# Patient Record
Sex: FEMALE | Race: WHITE
[De-identification: names, ages, dates, MRNs, and addresses within clinical notes are randomized per-mention and may not be internally consistent; named-entity substitution may affect disease eponyms.]

---

## 2017-07-20 ENCOUNTER — HOSPITAL ENCOUNTER (INPATIENT)
Dept: HOSPITAL 17 - NEPI | Age: 37
LOS: 8 days | Discharge: HOME | DRG: 957 | End: 2017-07-28
Attending: SURGERY | Admitting: SURGERY
Payer: COMMERCIAL

## 2017-07-20 VITALS — WEIGHT: 111.33 LBS | HEIGHT: 67 IN | BODY MASS INDEX: 17.47 KG/M2

## 2017-07-20 VITALS — OXYGEN SATURATION: 100 %

## 2017-07-20 VITALS — OXYGEN SATURATION: 99 % | TEMPERATURE: 98 F | RESPIRATION RATE: 16 BRPM | HEART RATE: 66 BPM

## 2017-07-20 DIAGNOSIS — F41.9: ICD-10-CM

## 2017-07-20 DIAGNOSIS — F33.41: ICD-10-CM

## 2017-07-20 DIAGNOSIS — R40.2422: ICD-10-CM

## 2017-07-20 DIAGNOSIS — S32.038A: ICD-10-CM

## 2017-07-20 DIAGNOSIS — F11.20: ICD-10-CM

## 2017-07-20 DIAGNOSIS — S01.81XA: ICD-10-CM

## 2017-07-20 DIAGNOSIS — T17.890A: ICD-10-CM

## 2017-07-20 DIAGNOSIS — T79.4XXA: ICD-10-CM

## 2017-07-20 DIAGNOSIS — E87.6: ICD-10-CM
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
## 2017-07-20 DIAGNOSIS — Z88.0: ICD-10-CM

## 2017-07-20 DIAGNOSIS — S32.592A: ICD-10-CM

## 2017-07-20 DIAGNOSIS — S36.032A: Primary | ICD-10-CM

## 2017-07-20 DIAGNOSIS — J98.11: ICD-10-CM
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
## 2017-07-20 DIAGNOSIS — M41.9: ICD-10-CM

## 2017-07-20 DIAGNOSIS — J96.01: ICD-10-CM

## 2017-07-20 DIAGNOSIS — S06.6X0A: ICD-10-CM

## 2017-07-20 LAB
APTT BLD: 22.6 SEC (ref 24.3–30.1)
BASE EXCESS BLD CALC-SCNC: -1.2 MMOL/L (ref -2–2)
BASE EXCESS BLD CALC-SCNC: -4.6 MMOL/L (ref -2–2)
BASOPHILS # BLD AUTO: 0 TH/MM3 (ref 0–0.2)
BASOPHILS NFR BLD: 0.3 % (ref 0–2)
BENZODIAZEPINES PNL UR: 93 % (ref 90–100)
BENZODIAZEPINES PNL UR: 96 % (ref 90–100)
BETA HCG QUANT: (no result) MIU/ML (ref 0–5)
BLOOD GAS CARBOXYHEMOGLOBIN: 3.5 % (ref 0–4)
BLOOD GAS CARBOXYHEMOGLOBIN: 5.6 % (ref 0–4)
BLOOD GAS HCO3: 20 MMOL/L (ref 22–26)
BLOOD GAS HCO3: 23 MMOL/L (ref 22–26)
BLOOD GAS OXYGEN CONTENT: 12.5 VOL % (ref 12–20)
BLOOD GAS OXYGEN CONTENT: 12.6 VOL % (ref 12–20)
BLOOD GAS PCO2: 34 MMHG (ref 38–42)
BLOOD GAS PCO2: 42 MMHG (ref 38–42)
CRITICAL VALUE: NO
CRITICAL VALUE: YES
DRAW SITE: (no result)
DRAW SITE: (no result)
EOSINOPHIL # BLD: 0.1 TH/MM3 (ref 0–0.4)
EOSINOPHIL NFR BLD: 0.8 % (ref 0–4)
ERYTHROCYTE [DISTWIDTH] IN BLOOD BY AUTOMATED COUNT: 14 % (ref 11.6–17.2)
HCT VFR BLD CALC: 28.3 % (ref 35–46)
HEMO FLAGS: (no result)
I-STAT POTASSIUM: 3 MMOL/L (ref 3.5–4.9)
I-STAT SODIUM: 142 MMOL/L (ref 138–146)
INR PPP: 1 RATIO
LITER FLOW: 2 L/M
LYMPHOCYTES # BLD AUTO: 2.1 TH/MM3 (ref 1–4.8)
LYMPHOCYTES NFR BLD AUTO: 30 % (ref 9–44)
MCH RBC QN AUTO: 33.6 PG (ref 27–34)
MCHC RBC AUTO-ENTMCNC: 36.5 % (ref 32–36)
MCV RBC AUTO: 92.1 FL (ref 80–100)
METHGB MFR BLDA: 0.6 % (ref 0–2)
METHGB MFR BLDA: 0.6 % (ref 0–2)
MONOCYTES NFR BLD: 4 % (ref 0–8)
NEUTROPHILS # BLD AUTO: 4.6 TH/MM3 (ref 1.8–7.7)
NEUTROPHILS NFR BLD AUTO: 64.9 % (ref 16–70)
NUMBER OF ARTERIAL PUNCTURES: 1
O2/TOTAL GAS SETTING VFR VENT: 50 %
OXYGEN DEVICE: (no result)
OXYGEN DEVICE: (no result)
PLATELET # BLD: 159 TH/MM3 (ref 150–450)
PO2 BLD: 116 MMHG (ref 61–120)
PO2 BLD: 279 MMHG (ref 61–120)
PROTHROMBIN TIME: 11 SEC (ref 9.8–11.6)
RBC # BLD AUTO: 3.08 MIL/MM3 (ref 4–5.3)
SALICYLATES SERPL-MCNC: 8.7 G/DL (ref 12–16)
SALICYLATES SERPL-MCNC: 9.5 G/DL (ref 12–16)
STAT: YES
STAT: YES
TEMP CORR TO: 98.6
TEMP CORR TO: 98.6
ULNAR PULSE: PRESENT
WBC # BLD AUTO: 7.1 TH/MM3 (ref 4–11)

## 2017-07-20 PROCEDURE — 94664 DEMO&/EVAL PT USE INHALER: CPT

## 2017-07-20 PROCEDURE — 0FBG0ZZ EXCISION OF PANCREAS, OPEN APPROACH: ICD-10-PCS | Performed by: SURGERY

## 2017-07-20 PROCEDURE — 84100 ASSAY OF PHOSPHORUS: CPT

## 2017-07-20 PROCEDURE — 90670 PCV13 VACCINE IM: CPT

## 2017-07-20 PROCEDURE — 86920 COMPATIBILITY TEST SPIN: CPT

## 2017-07-20 PROCEDURE — 80076 HEPATIC FUNCTION PANEL: CPT

## 2017-07-20 PROCEDURE — 94150 VITAL CAPACITY TEST: CPT

## 2017-07-20 PROCEDURE — 36430 TRANSFUSION BLD/BLD COMPNT: CPT

## 2017-07-20 PROCEDURE — 80307 DRUG TEST PRSMV CHEM ANLYZR: CPT

## 2017-07-20 PROCEDURE — 86900 BLOOD TYPING SEROLOGIC ABO: CPT

## 2017-07-20 PROCEDURE — 82565 ASSAY OF CREATININE: CPT

## 2017-07-20 PROCEDURE — 93005 ELECTROCARDIOGRAM TRACING: CPT

## 2017-07-20 PROCEDURE — 84155 ASSAY OF PROTEIN SERUM: CPT

## 2017-07-20 PROCEDURE — 0HQ1XZZ REPAIR FACE SKIN, EXTERNAL APPROACH: ICD-10-PCS | Performed by: SURGERY

## 2017-07-20 PROCEDURE — 94668 MNPJ CHEST WALL SBSQ: CPT

## 2017-07-20 PROCEDURE — 94667 MNPJ CHEST WALL 1ST: CPT

## 2017-07-20 PROCEDURE — 81001 URINALYSIS AUTO W/SCOPE: CPT

## 2017-07-20 PROCEDURE — 30233N1 TRANSFUSION OF NONAUTOLOGOUS RED BLOOD CELLS INTO PERIPHERAL VEIN, PERCUTANEOUS APPROACH: ICD-10-PCS

## 2017-07-20 PROCEDURE — 80053 COMPREHEN METABOLIC PANEL: CPT

## 2017-07-20 PROCEDURE — C9113 INJ PANTOPRAZOLE SODIUM, VIA: HCPCS

## 2017-07-20 PROCEDURE — P9017 PLASMA 1 DONOR FRZ W/IN 8 HR: HCPCS

## 2017-07-20 PROCEDURE — 85027 COMPLETE CBC AUTOMATED: CPT

## 2017-07-20 PROCEDURE — 82947 ASSAY GLUCOSE BLOOD QUANT: CPT

## 2017-07-20 PROCEDURE — 85610 PROTHROMBIN TIME: CPT

## 2017-07-20 PROCEDURE — 94640 AIRWAY INHALATION TREATMENT: CPT

## 2017-07-20 PROCEDURE — 94002 VENT MGMT INPAT INIT DAY: CPT

## 2017-07-20 PROCEDURE — 85730 THROMBOPLASTIN TIME PARTIAL: CPT

## 2017-07-20 PROCEDURE — 90715 TDAP VACCINE 7 YRS/> IM: CPT

## 2017-07-20 PROCEDURE — 85025 COMPLETE CBC W/AUTO DIFF WBC: CPT

## 2017-07-20 PROCEDURE — 80202 ASSAY OF VANCOMYCIN: CPT

## 2017-07-20 PROCEDURE — 84295 ASSAY OF SERUM SODIUM: CPT

## 2017-07-20 PROCEDURE — 84484 ASSAY OF TROPONIN QUANT: CPT

## 2017-07-20 PROCEDURE — 85007 BL SMEAR W/DIFF WBC COUNT: CPT

## 2017-07-20 PROCEDURE — 72170 X-RAY EXAM OF PELVIS: CPT

## 2017-07-20 PROCEDURE — 82948 REAGENT STRIP/BLOOD GLUCOSE: CPT

## 2017-07-20 PROCEDURE — 87205 SMEAR GRAM STAIN: CPT

## 2017-07-20 PROCEDURE — 36600 WITHDRAWAL OF ARTERIAL BLOOD: CPT

## 2017-07-20 PROCEDURE — 88305 TISSUE EXAM BY PATHOLOGIST: CPT

## 2017-07-20 PROCEDURE — 87070 CULTURE OTHR SPECIMN AEROBIC: CPT

## 2017-07-20 PROCEDURE — 94003 VENT MGMT INPAT SUBQ DAY: CPT

## 2017-07-20 PROCEDURE — 80048 BASIC METABOLIC PNL TOTAL CA: CPT

## 2017-07-20 PROCEDURE — 70450 CT HEAD/BRAIN W/O DYE: CPT

## 2017-07-20 PROCEDURE — 72131 CT LUMBAR SPINE W/O DYE: CPT

## 2017-07-20 PROCEDURE — 82805 BLOOD GASES W/O2 SATURATION: CPT

## 2017-07-20 PROCEDURE — 82552 ASSAY OF CPK IN BLOOD: CPT

## 2017-07-20 PROCEDURE — P9016 RBC LEUKOCYTES REDUCED: HCPCS

## 2017-07-20 PROCEDURE — 84132 ASSAY OF SERUM POTASSIUM: CPT

## 2017-07-20 PROCEDURE — 86901 BLOOD TYPING SEROLOGIC RH(D): CPT

## 2017-07-20 PROCEDURE — 90734 MENACWYD/MENACWYCRM VACC IM: CPT

## 2017-07-20 PROCEDURE — 82550 ASSAY OF CK (CPK): CPT

## 2017-07-20 PROCEDURE — 07TP0ZZ RESECTION OF SPLEEN, OPEN APPROACH: ICD-10-PCS | Performed by: SURGERY

## 2017-07-20 PROCEDURE — 71260 CT THORAX DX C+: CPT

## 2017-07-20 PROCEDURE — 86927 PLASMA FRESH FROZEN: CPT

## 2017-07-20 PROCEDURE — 87641 MR-STAPH DNA AMP PROBE: CPT

## 2017-07-20 PROCEDURE — 87086 URINE CULTURE/COLONY COUNT: CPT

## 2017-07-20 PROCEDURE — 74177 CT ABD & PELVIS W/CONTRAST: CPT

## 2017-07-20 PROCEDURE — 72125 CT NECK SPINE W/O DYE: CPT

## 2017-07-20 PROCEDURE — 82435 ASSAY OF BLOOD CHLORIDE: CPT

## 2017-07-20 PROCEDURE — 30233L1 TRANSFUSION OF NONAUTOLOGOUS FRESH PLASMA INTO PERIPHERAL VEIN, PERCUTANEOUS APPROACH: ICD-10-PCS

## 2017-07-20 PROCEDURE — 86850 RBC ANTIBODY SCREEN: CPT

## 2017-07-20 PROCEDURE — 71010: CPT

## 2017-07-20 PROCEDURE — 83735 ASSAY OF MAGNESIUM: CPT

## 2017-07-20 PROCEDURE — 70486 CT MAXILLOFACIAL W/O DYE: CPT

## 2017-07-20 PROCEDURE — 84702 CHORIONIC GONADOTROPIN TEST: CPT

## 2017-07-20 PROCEDURE — 84520 ASSAY OF UREA NITROGEN: CPT

## 2017-07-20 PROCEDURE — 72128 CT CHEST SPINE W/O DYE: CPT
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
## 2017-07-20 NOTE — RADRPT
EXAM DATE/TIME:  07/20/2017 22:42 

 

[facility] COMPARISON:     

CHEST SINGLE AP, July 20, 2017, 21:59.

 

                     

INDICATIONS :     

Evaluate line placement

Trauma Alert

                     

 

MEDICAL HISTORY :     

None.          

 

SURGICAL HISTORY :     

None.   

 

ENCOUNTER:     

Initial                                        

 

ACUITY:     

1 day      

 

PAIN SCORE:     

Non-responsive.

 

LOCATION:      

chest 

 

FINDINGS:     

Left subclavian central venous introducer sheath noted and the tip overlies the expected location of 
the SVC. The lungs are clear. Heart size normal.

 

CONCLUSION:     

Left subclavian central venous catheter sheath noted as above.

 

 

 

 Montana Patel MD on July 20, 2017 at 23:02           

Board Certified Radiologist.

 This report was verified electronically.
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
## 2017-07-21 VITALS
DIASTOLIC BLOOD PRESSURE: 90 MMHG | HEART RATE: 74 BPM | SYSTOLIC BLOOD PRESSURE: 157 MMHG | TEMPERATURE: 99.1 F | RESPIRATION RATE: 18 BRPM | OXYGEN SATURATION: 100 %

## 2017-07-21 VITALS
TEMPERATURE: 99.1 F | RESPIRATION RATE: 15 BRPM | OXYGEN SATURATION: 100 % | HEART RATE: 75 BPM | DIASTOLIC BLOOD PRESSURE: 85 MMHG | SYSTOLIC BLOOD PRESSURE: 163 MMHG

## 2017-07-21 VITALS
TEMPERATURE: 97.8 F | HEART RATE: 62 BPM | OXYGEN SATURATION: 100 % | RESPIRATION RATE: 14 BRPM | DIASTOLIC BLOOD PRESSURE: 94 MMHG | SYSTOLIC BLOOD PRESSURE: 168 MMHG

## 2017-07-21 VITALS — OXYGEN SATURATION: 100 %

## 2017-07-21 VITALS
OXYGEN SATURATION: 100 % | SYSTOLIC BLOOD PRESSURE: 148 MMHG | DIASTOLIC BLOOD PRESSURE: 84 MMHG | TEMPERATURE: 98.3 F | HEART RATE: 68 BPM | RESPIRATION RATE: 14 BRPM

## 2017-07-21 VITALS — HEART RATE: 80 BPM

## 2017-07-21 VITALS — HEART RATE: 72 BPM

## 2017-07-21 VITALS
DIASTOLIC BLOOD PRESSURE: 78 MMHG | SYSTOLIC BLOOD PRESSURE: 147 MMHG | OXYGEN SATURATION: 100 % | HEART RATE: 71 BPM | TEMPERATURE: 99.3 F | RESPIRATION RATE: 14 BRPM

## 2017-07-21 VITALS — HEART RATE: 67 BPM

## 2017-07-21 VITALS — HEART RATE: 60 BPM

## 2017-07-21 VITALS — OXYGEN SATURATION: 95 %

## 2017-07-21 VITALS — HEART RATE: 66 BPM

## 2017-07-21 LAB
ALP SERPL-CCNC: 45 U/L (ref 45–117)
ALT SERPL-CCNC: 73 U/L (ref 10–53)
AMPHETAMINE, URINE: (no result)
ANION GAP SERPL CALC-SCNC: 9 MEQ/L (ref 5–15)
AST SERPL-CCNC: 117 U/L (ref 15–37)
BACTERIA #/AREA URNS HPF: (no result) /HPF
BARBITURATES, URINE: (no result)
BASE EXCESS BLD CALC-SCNC: -3 MMOL/L (ref -2–2)
BASOPHILS # BLD AUTO: 0 TH/MM3 (ref 0–0.2)
BASOPHILS NFR BLD: 0.2 % (ref 0–2)
BENZODIAZEPINES PNL UR: 98 % (ref 90–100)
BILIRUB INDIRECT SERPL-MCNC: 0.5 MG/DL (ref 0–0.8)
BILIRUB SERPL-MCNC: 0.6 MG/DL (ref 0.2–1)
BLOOD GAS CARBOXYHEMOGLOBIN: 1.1 % (ref 0–4)
BLOOD GAS HCO3: 21 MMOL/L (ref 22–26)
BLOOD GAS OXYGEN CONTENT: 15.8 VOL % (ref 12–20)
BLOOD GAS PCO2: 37 MMHG (ref 38–42)
BUN SERPL-MCNC: 7 MG/DL (ref 7–18)
CALCIUM TP COR SERPL-MCNC: 8.4 MG/DL (ref 8.5–10.1)
CHLORIDE SERPL-SCNC: 110 MEQ/L (ref 98–107)
COCAINE UR-MCNC: (no result) NG/ML
COLOR UR: YELLOW
COMMENT (UR): (no result)
CRITICAL VALUE: NO
CULTURE IF INDICATED: (no result)
DRAW SITE: (no result)
EOSINOPHIL # BLD: 0 TH/MM3 (ref 0–0.4)
EOSINOPHIL NFR BLD: 0 % (ref 0–4)
ERYTHROCYTE [DISTWIDTH] IN BLOOD BY AUTOMATED COUNT: 16.1 % (ref 11.6–17.2)
GFR SERPLBLD BASED ON 1.73 SQ M-ARVRAT: 167 ML/MIN (ref 89–?)
GLUCOSE UR STRIP-MCNC: (no result) MG/DL
HCO3 BLD-SCNC: 22.4 MEQ/L (ref 21–32)
HCT VFR BLD CALC: 32.9 % (ref 35–46)
HEMO FLAGS: (no result)
HGB UR QL STRIP: (no result)
INR PPP: 1 RATIO
KETONES UR STRIP-MCNC: (no result) MG/DL
LYMPHOCYTES # BLD AUTO: 0.6 TH/MM3 (ref 1–4.8)
LYMPHOCYTES NFR BLD AUTO: 3.6 % (ref 9–44)
MAGNESIUM SERPL-MCNC: 1.8 MG/DL (ref 1.5–2.5)
MCH RBC QN AUTO: 29.8 PG (ref 27–34)
MCHC RBC AUTO-ENTMCNC: 34.9 % (ref 32–36)
MCV RBC AUTO: 85.4 FL (ref 80–100)
METAMYELOCYTES NFR BLD: 1 % (ref 0–1)
METHGB MFR BLDA: 1 % (ref 0–2)
MONOCYTES NFR BLD: 4.7 % (ref 0–8)
MUCOUS THREADS #/AREA URNS LPF: (no result) /LPF
MYELOCYTES NFR BLD: 1 % (ref 0–0)
NEUTROPHILS # BLD AUTO: 14.2 TH/MM3 (ref 1.8–7.7)
NEUTROPHILS NFR BLD AUTO: 91.5 % (ref 16–70)
NEUTS BAND # BLD MANUAL: 5 TH/MM3 (ref 1.8–7.7)
NEUTS BAND NFR BLD: 11 % (ref 0–6)
NEUTS SEG NFR BLD MANUAL: 58 % (ref 16–70)
NITRITE UR QL STRIP: (no result)
O2/TOTAL GAS SETTING VFR VENT: 50 %
OXYGEN DEVICE: (no result)
PLAT MORPH BLD: NORMAL
PLATELET BLD QL SMEAR: NORMAL
PO2 BLD: 256 MMHG (ref 61–120)
POTASSIUM SERPL-SCNC: 3.8 MEQ/L (ref 3.5–5.1)
PROTHROMBIN TIME: 11.4 SEC (ref 9.8–11.6)
RBC # BLD AUTO: 3.85 MIL/MM3 (ref 4–5.3)
SALICYLATES SERPL-MCNC: 11.1 G/DL (ref 12–16)
SCAN/DIFF: (no result)
SCAN/DIFF: (no result)
SODIUM SERPL-SCNC: 141 MEQ/L (ref 136–145)
SP GR UR STRIP: 1.04 (ref 1–1.03)
STAT: NO
TEMP CORR TO: 98.6
ULNAR PULSE: PRESENT
VENT SETTINGS: (no result)
WBC # BLD AUTO: 15.6 TH/MM3 (ref 4–11)
WBC DIFF SAMPLE: 100

## 2017-07-21 RX ADMIN — PANTOPRAZOLE SODIUM SCH MG: 40 INJECTION, POWDER, FOR SOLUTION INTRAVENOUS at 01:09

## 2017-07-21 RX ADMIN — WATER SCH ML: 1 IRRIGANT IRRIGATION at 08:34

## 2017-07-21 RX ADMIN — LEVETIRACETAM SCH MLS/HR: 100 INJECTION, SOLUTION, CONCENTRATE INTRAVENOUS at 02:23

## 2017-07-21 RX ADMIN — LEVOFLOXACIN SCH MLS/HR: 5 INJECTION, SOLUTION INTRAVENOUS at 23:56

## 2017-07-21 RX ADMIN — SODIUM CHLORIDE SCH MLS/HR: 900 INJECTION, SOLUTION INTRAVENOUS at 07:34

## 2017-07-21 RX ADMIN — STANDARDIZED SENNA CONCENTRATE AND DOCUSATE SODIUM SCH TAB: 8.6; 5 TABLET, FILM COATED ORAL at 08:34

## 2017-07-21 RX ADMIN — OXYTOCIN SCH MLS/HR: 10 INJECTION, SOLUTION INTRAMUSCULAR; INTRAVENOUS at 11:14

## 2017-07-21 RX ADMIN — LEVOFLOXACIN SCH MLS/HR: 5 INJECTION, SOLUTION INTRAVENOUS at 01:07

## 2017-07-21 RX ADMIN — SODIUM CHLORIDE SCH MLS/HR: 900 INJECTION, SOLUTION INTRAVENOUS at 16:06

## 2017-07-21 RX ADMIN — Medication SCH ML: at 21:00

## 2017-07-21 RX ADMIN — OXYCODONE HYDROCHLORIDE AND ACETAMINOPHEN PRN TAB: 5; 325 TABLET ORAL at 20:23

## 2017-07-21 RX ADMIN — OXYTOCIN SCH MLS/HR: 10 INJECTION, SOLUTION INTRAMUSCULAR; INTRAVENOUS at 23:56

## 2017-07-21 RX ADMIN — OXYTOCIN SCH MLS/HR: 10 INJECTION, SOLUTION INTRAMUSCULAR; INTRAVENOUS at 06:02

## 2017-07-21 RX ADMIN — CHLORHEXIDINE GLUCONATE 0.12% ORAL RINSE SCH ML: 1.2 LIQUID ORAL at 20:00

## 2017-07-21 RX ADMIN — LEVETIRACETAM SCH MLS/HR: 100 INJECTION, SOLUTION, CONCENTRATE INTRAVENOUS at 07:34

## 2017-07-21 RX ADMIN — CHLORHEXIDINE GLUCONATE 0.12% ORAL RINSE SCH ML: 1.2 LIQUID ORAL at 08:33

## 2017-07-21 RX ADMIN — Medication SCH ML: at 07:34

## 2017-07-21 RX ADMIN — OXYTOCIN SCH MLS/HR: 10 INJECTION, SOLUTION INTRAMUSCULAR; INTRAVENOUS at 00:37

## 2017-07-21 RX ADMIN — LEVETIRACETAM SCH MLS/HR: 100 INJECTION, SOLUTION, CONCENTRATE INTRAVENOUS at 20:46

## 2017-07-21 RX ADMIN — HYDROMORPHONE HYDROCHLORIDE PRN MG: 1 INJECTION, SOLUTION INTRAMUSCULAR; INTRAVENOUS; SUBCUTANEOUS at 20:23

## 2017-07-21 RX ADMIN — PROPOFOL SCH MLS/HR: 10 INJECTION, EMULSION INTRAVENOUS at 00:52

## 2017-07-21 RX ADMIN — SODIUM CHLORIDE SCH MLS/HR: 900 INJECTION, SOLUTION INTRAVENOUS at 01:08

## 2017-07-21 RX ADMIN — PANTOPRAZOLE SODIUM SCH MG: 40 INJECTION, POWDER, FOR SOLUTION INTRAVENOUS at 23:55

## 2017-07-21 RX ADMIN — PROPOFOL SCH MLS/HR: 10 INJECTION, EMULSION INTRAVENOUS at 07:34

## 2017-07-21 RX ADMIN — STANDARDIZED SENNA CONCENTRATE AND DOCUSATE SODIUM SCH TAB: 8.6; 5 TABLET, FILM COATED ORAL at 21:00

## 2017-07-21 RX ADMIN — HYDROMORPHONE HYDROCHLORIDE PRN MG: 1 INJECTION, SOLUTION INTRAMUSCULAR; INTRAVENOUS; SUBCUTANEOUS at 16:06
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
## 2017-07-21 NOTE — RADRPT
EXAM DATE/TIME:  07/21/2017 01:39 

 

[facility] COMPARISON:     

CHEST SINGLE AP, July 20, 2017, 22:42.

 

                     

INDICATIONS :     

Confirm ETT placement

                     

 

MEDICAL HISTORY :     

None.          

 

SURGICAL HISTORY :     

None.   

 

ENCOUNTER:     

Subsequent                                        

 

ACUITY:     

2 days      

 

PAIN SCORE:     

Non-responsive.

 

LOCATION:     

Bilateral chest 

 

FINDINGS:     

Endotracheal tube is in good position above the carolynn. The cardiac silhouette is normal in transvers
e diameter. The lungs are free of acute parenchymal opacity. No effusions are identified. A nasogastr
ic tube is in place with its tip in the stomach. A left sided subclavian vein catheter is in place wi
thout pneumothorax with its tip in the superior vena cava.

 

CONCLUSION:     

1. Satisfactory position of endotracheal tube as above.

 

 

 

 Gage Hewitt MD on July 21, 2017 at 2:11           

Board Certified Radiologist.

 This report was verified electronically.
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
## 2017-07-22 VITALS
SYSTOLIC BLOOD PRESSURE: 146 MMHG | TEMPERATURE: 98.6 F | OXYGEN SATURATION: 97 % | HEART RATE: 87 BPM | DIASTOLIC BLOOD PRESSURE: 95 MMHG | RESPIRATION RATE: 22 BRPM

## 2017-07-22 VITALS
HEART RATE: 108 BPM | DIASTOLIC BLOOD PRESSURE: 94 MMHG | RESPIRATION RATE: 33 BRPM | SYSTOLIC BLOOD PRESSURE: 170 MMHG | OXYGEN SATURATION: 97 % | TEMPERATURE: 98.6 F

## 2017-07-22 VITALS
SYSTOLIC BLOOD PRESSURE: 152 MMHG | HEART RATE: 96 BPM | DIASTOLIC BLOOD PRESSURE: 100 MMHG | RESPIRATION RATE: 18 BRPM | TEMPERATURE: 98.4 F | OXYGEN SATURATION: 98 %

## 2017-07-22 VITALS — HEART RATE: 76 BPM

## 2017-07-22 VITALS
TEMPERATURE: 98.7 F | SYSTOLIC BLOOD PRESSURE: 178 MMHG | HEART RATE: 82 BPM | DIASTOLIC BLOOD PRESSURE: 100 MMHG | RESPIRATION RATE: 23 BRPM | OXYGEN SATURATION: 94 %

## 2017-07-22 VITALS
DIASTOLIC BLOOD PRESSURE: 97 MMHG | SYSTOLIC BLOOD PRESSURE: 149 MMHG | HEART RATE: 101 BPM | OXYGEN SATURATION: 97 % | TEMPERATURE: 98.8 F | RESPIRATION RATE: 16 BRPM

## 2017-07-22 VITALS
DIASTOLIC BLOOD PRESSURE: 63 MMHG | RESPIRATION RATE: 19 BRPM | TEMPERATURE: 98.9 F | HEART RATE: 84 BPM | OXYGEN SATURATION: 98 % | SYSTOLIC BLOOD PRESSURE: 159 MMHG

## 2017-07-22 VITALS — HEART RATE: 82 BPM

## 2017-07-22 VITALS — HEART RATE: 108 BPM

## 2017-07-22 VITALS — HEART RATE: 92 BPM

## 2017-07-22 VITALS — OXYGEN SATURATION: 98 %

## 2017-07-22 LAB
ALP SERPL-CCNC: 50 U/L (ref 45–117)
ALT SERPL-CCNC: 50 U/L (ref 10–53)
ANION GAP SERPL CALC-SCNC: 9 MEQ/L (ref 5–15)
AST SERPL-CCNC: 52 U/L (ref 15–37)
BASOPHILS # BLD AUTO: 0 TH/MM3 (ref 0–0.2)
BASOPHILS NFR BLD: 0.2 % (ref 0–2)
BILIRUB INDIRECT SERPL-MCNC: 0.4 MG/DL (ref 0–0.8)
BILIRUB SERPL-MCNC: 0.5 MG/DL (ref 0.2–1)
BUN SERPL-MCNC: 5 MG/DL (ref 7–18)
CHLORIDE SERPL-SCNC: 109 MEQ/L (ref 98–107)
EOSINOPHIL # BLD: 0 TH/MM3 (ref 0–0.4)
EOSINOPHIL NFR BLD: 0.2 % (ref 0–4)
ERYTHROCYTE [DISTWIDTH] IN BLOOD BY AUTOMATED COUNT: 16.3 % (ref 11.6–17.2)
GFR SERPLBLD BASED ON 1.73 SQ M-ARVRAT: 199 ML/MIN (ref 89–?)
HCO3 BLD-SCNC: 24 MEQ/L (ref 21–32)
HCT VFR BLD CALC: 31 % (ref 35–46)
HEMO FLAGS: (no result)
INR PPP: 1.1 RATIO
LYMPHOCYTES # BLD AUTO: 1.2 TH/MM3 (ref 1–4.8)
LYMPHOCYTES NFR BLD AUTO: 7.2 % (ref 9–44)
MCH RBC QN AUTO: 28.8 PG (ref 27–34)
MCHC RBC AUTO-ENTMCNC: 33 % (ref 32–36)
MCV RBC AUTO: 87.4 FL (ref 80–100)
MONOCYTES NFR BLD: 6.7 % (ref 0–8)
NEUTROPHILS # BLD AUTO: 14.7 TH/MM3 (ref 1.8–7.7)
NEUTROPHILS NFR BLD AUTO: 85.7 % (ref 16–70)
PLATELET # BLD: 120 TH/MM3 (ref 150–450)
POTASSIUM SERPL-SCNC: 3.8 MEQ/L (ref 3.5–5.1)
PROTHROMBIN TIME: 11.8 SEC (ref 9.8–11.6)
RBC # BLD AUTO: 3.54 MIL/MM3 (ref 4–5.3)
SODIUM SERPL-SCNC: 142 MEQ/L (ref 136–145)
WBC # BLD AUTO: 17.1 TH/MM3 (ref 4–11)

## 2017-07-22 RX ADMIN — MORPHINE SULFATE PRN MG: 2 INJECTION, SOLUTION INTRAMUSCULAR; INTRAVENOUS at 13:26

## 2017-07-22 RX ADMIN — MORPHINE SULFATE PRN MG: 2 INJECTION, SOLUTION INTRAMUSCULAR; INTRAVENOUS at 20:18

## 2017-07-22 RX ADMIN — HYDROMORPHONE HYDROCHLORIDE PRN MG: 1 INJECTION, SOLUTION INTRAMUSCULAR; INTRAVENOUS; SUBCUTANEOUS at 05:50

## 2017-07-22 RX ADMIN — MORPHINE SULFATE PRN MG: 2 INJECTION, SOLUTION INTRAMUSCULAR; INTRAVENOUS at 16:24

## 2017-07-22 RX ADMIN — Medication SCH ML: at 20:27

## 2017-07-22 RX ADMIN — LEVETIRACETAM SCH MLS/HR: 100 INJECTION, SOLUTION, CONCENTRATE INTRAVENOUS at 08:42

## 2017-07-22 RX ADMIN — HYDROMORPHONE HYDROCHLORIDE PRN MG: 1 INJECTION, SOLUTION INTRAMUSCULAR; INTRAVENOUS; SUBCUTANEOUS at 10:31

## 2017-07-22 RX ADMIN — HYDROMORPHONE HYDROCHLORIDE PRN MG: 1 INJECTION, SOLUTION INTRAMUSCULAR; INTRAVENOUS; SUBCUTANEOUS at 01:24

## 2017-07-22 RX ADMIN — STANDARDIZED SENNA CONCENTRATE AND DOCUSATE SODIUM SCH TAB: 8.6; 5 TABLET, FILM COATED ORAL at 08:42

## 2017-07-22 RX ADMIN — STANDARDIZED SENNA CONCENTRATE AND DOCUSATE SODIUM SCH TAB: 8.6; 5 TABLET, FILM COATED ORAL at 20:19

## 2017-07-22 RX ADMIN — LEVETIRACETAM SCH MLS/HR: 100 INJECTION, SOLUTION, CONCENTRATE INTRAVENOUS at 20:19

## 2017-07-22 RX ADMIN — OXYTOCIN SCH MLS/HR: 10 INJECTION, SOLUTION INTRAMUSCULAR; INTRAVENOUS at 16:24

## 2017-07-22 RX ADMIN — OXYCODONE HYDROCHLORIDE AND ACETAMINOPHEN PRN TAB: 5; 325 TABLET ORAL at 08:42

## 2017-07-22 RX ADMIN — OXYCODONE HYDROCHLORIDE AND ACETAMINOPHEN PRN TAB: 5; 325 TABLET ORAL at 22:51

## 2017-07-22 RX ADMIN — OXYCODONE HYDROCHLORIDE AND ACETAMINOPHEN PRN TAB: 5; 325 TABLET ORAL at 17:38
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
## 2017-07-23 VITALS
TEMPERATURE: 97.8 F | HEART RATE: 104 BPM | RESPIRATION RATE: 20 BRPM | OXYGEN SATURATION: 95 % | DIASTOLIC BLOOD PRESSURE: 97 MMHG | SYSTOLIC BLOOD PRESSURE: 141 MMHG

## 2017-07-23 VITALS
TEMPERATURE: 98.7 F | SYSTOLIC BLOOD PRESSURE: 159 MMHG | HEART RATE: 104 BPM | RESPIRATION RATE: 18 BRPM | OXYGEN SATURATION: 95 % | DIASTOLIC BLOOD PRESSURE: 112 MMHG

## 2017-07-23 VITALS
OXYGEN SATURATION: 94 % | DIASTOLIC BLOOD PRESSURE: 103 MMHG | HEART RATE: 97 BPM | SYSTOLIC BLOOD PRESSURE: 149 MMHG | RESPIRATION RATE: 16 BRPM | TEMPERATURE: 98.3 F

## 2017-07-23 VITALS
RESPIRATION RATE: 18 BRPM | SYSTOLIC BLOOD PRESSURE: 148 MMHG | DIASTOLIC BLOOD PRESSURE: 92 MMHG | TEMPERATURE: 97.8 F | HEART RATE: 101 BPM | OXYGEN SATURATION: 98 %

## 2017-07-23 VITALS
RESPIRATION RATE: 18 BRPM | TEMPERATURE: 97.2 F | DIASTOLIC BLOOD PRESSURE: 105 MMHG | OXYGEN SATURATION: 98 % | HEART RATE: 102 BPM | SYSTOLIC BLOOD PRESSURE: 153 MMHG

## 2017-07-23 VITALS
SYSTOLIC BLOOD PRESSURE: 127 MMHG | TEMPERATURE: 99.1 F | HEART RATE: 97 BPM | OXYGEN SATURATION: 95 % | DIASTOLIC BLOOD PRESSURE: 96 MMHG | RESPIRATION RATE: 20 BRPM

## 2017-07-23 RX ADMIN — OXYCODONE HYDROCHLORIDE AND ACETAMINOPHEN PRN TAB: 5; 325 TABLET ORAL at 11:26

## 2017-07-23 RX ADMIN — Medication SCH ML: at 08:58

## 2017-07-23 RX ADMIN — MAGNESIUM HYDROXIDE SCH ML: 400 SUSPENSION ORAL at 21:54

## 2017-07-23 RX ADMIN — STANDARDIZED SENNA CONCENTRATE AND DOCUSATE SODIUM SCH TAB: 8.6; 5 TABLET, FILM COATED ORAL at 21:54

## 2017-07-23 RX ADMIN — MORPHINE SULFATE PRN MG: 2 INJECTION, SOLUTION INTRAMUSCULAR; INTRAVENOUS at 20:04

## 2017-07-23 RX ADMIN — ENOXAPARIN SODIUM SCH MG: 30 INJECTION SUBCUTANEOUS at 23:30

## 2017-07-23 RX ADMIN — OXYCODONE HYDROCHLORIDE AND ACETAMINOPHEN PRN TAB: 5; 325 TABLET ORAL at 16:42

## 2017-07-23 RX ADMIN — ENOXAPARIN SODIUM SCH MG: 30 INJECTION SUBCUTANEOUS at 13:07

## 2017-07-23 RX ADMIN — WATER SCH ML: 1 IRRIGANT IRRIGATION at 08:57

## 2017-07-23 RX ADMIN — MORPHINE SULFATE PRN MG: 2 INJECTION, SOLUTION INTRAMUSCULAR; INTRAVENOUS at 08:58

## 2017-07-23 RX ADMIN — MORPHINE SULFATE PRN MG: 2 INJECTION, SOLUTION INTRAMUSCULAR; INTRAVENOUS at 02:07

## 2017-07-23 RX ADMIN — LEVOFLOXACIN SCH MLS/HR: 5 INJECTION, SOLUTION INTRAVENOUS at 00:55

## 2017-07-23 RX ADMIN — OXYTOCIN SCH MLS/HR: 10 INJECTION, SOLUTION INTRAMUSCULAR; INTRAVENOUS at 00:55

## 2017-07-23 RX ADMIN — LEVETIRACETAM SCH MLS/HR: 100 INJECTION, SOLUTION, CONCENTRATE INTRAVENOUS at 08:57

## 2017-07-23 RX ADMIN — MORPHINE SULFATE PRN MG: 2 INJECTION, SOLUTION INTRAMUSCULAR; INTRAVENOUS at 13:07

## 2017-07-23 RX ADMIN — OXYCODONE HYDROCHLORIDE AND ACETAMINOPHEN PRN TAB: 5; 325 TABLET ORAL at 21:54

## 2017-07-23 RX ADMIN — LEVOFLOXACIN SCH MLS/HR: 5 INJECTION, SOLUTION INTRAVENOUS at 23:43

## 2017-07-23 RX ADMIN — LEVETIRACETAM SCH MLS/HR: 100 INJECTION, SOLUTION, CONCENTRATE INTRAVENOUS at 21:49

## 2017-07-23 RX ADMIN — MORPHINE SULFATE PRN MG: 2 INJECTION, SOLUTION INTRAMUSCULAR; INTRAVENOUS at 06:50

## 2017-07-23 RX ADMIN — MORPHINE SULFATE PRN MG: 2 INJECTION, SOLUTION INTRAMUSCULAR; INTRAVENOUS at 23:43

## 2017-07-23 RX ADMIN — OXYCODONE HYDROCHLORIDE AND ACETAMINOPHEN PRN TAB: 5; 325 TABLET ORAL at 05:19

## 2017-07-23 RX ADMIN — STANDARDIZED SENNA CONCENTRATE AND DOCUSATE SODIUM SCH TAB: 8.6; 5 TABLET, FILM COATED ORAL at 08:57

## 2017-07-23 RX ADMIN — Medication SCH ML: at 21:52
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
## 2017-07-24 VITALS
HEART RATE: 94 BPM | DIASTOLIC BLOOD PRESSURE: 101 MMHG | TEMPERATURE: 98.1 F | OXYGEN SATURATION: 91 % | RESPIRATION RATE: 17 BRPM | SYSTOLIC BLOOD PRESSURE: 145 MMHG

## 2017-07-24 VITALS
OXYGEN SATURATION: 86 % | TEMPERATURE: 98.2 F | RESPIRATION RATE: 18 BRPM | SYSTOLIC BLOOD PRESSURE: 148 MMHG | HEART RATE: 91 BPM | DIASTOLIC BLOOD PRESSURE: 100 MMHG

## 2017-07-24 VITALS
RESPIRATION RATE: 18 BRPM | TEMPERATURE: 98.2 F | HEART RATE: 100 BPM | SYSTOLIC BLOOD PRESSURE: 136 MMHG | OXYGEN SATURATION: 96 % | DIASTOLIC BLOOD PRESSURE: 93 MMHG

## 2017-07-24 VITALS
OXYGEN SATURATION: 95 % | SYSTOLIC BLOOD PRESSURE: 141 MMHG | RESPIRATION RATE: 18 BRPM | TEMPERATURE: 97.8 F | HEART RATE: 96 BPM | DIASTOLIC BLOOD PRESSURE: 99 MMHG

## 2017-07-24 VITALS
HEART RATE: 94 BPM | SYSTOLIC BLOOD PRESSURE: 151 MMHG | RESPIRATION RATE: 18 BRPM | DIASTOLIC BLOOD PRESSURE: 104 MMHG | OXYGEN SATURATION: 91 % | TEMPERATURE: 98 F

## 2017-07-24 VITALS
TEMPERATURE: 100.1 F | HEART RATE: 124 BPM | DIASTOLIC BLOOD PRESSURE: 114 MMHG | RESPIRATION RATE: 23 BRPM | OXYGEN SATURATION: 91 % | SYSTOLIC BLOOD PRESSURE: 152 MMHG

## 2017-07-24 VITALS — OXYGEN SATURATION: 100 %

## 2017-07-24 LAB
ANION GAP SERPL CALC-SCNC: 7 MEQ/L (ref 5–15)
BASE EXCESS BLD CALC-SCNC: -1.1 MMOL/L (ref -2–2)
BENZODIAZEPINES PNL UR: 85 % (ref 90–100)
BLOOD GAS CARBOXYHEMOGLOBIN: 1.4 % (ref 0–4)
BLOOD GAS HCO3: 23 MMOL/L (ref 22–26)
BLOOD GAS OXYGEN CONTENT: 12.7 VOL % (ref 12–20)
BLOOD GAS PCO2: 34 MMHG (ref 38–42)
BUN SERPL-MCNC: 5 MG/DL (ref 7–18)
CHLORIDE SERPL-SCNC: 108 MEQ/L (ref 98–107)
CK MB SERPL-MCNC: 0.7 NG/ML (ref 0.5–3.6)
CK SERPL-CCNC: 101 U/L (ref 26–192)
CRITICAL VALUE: YES
DRAW SITE: (no result)
ERYTHROCYTE [DISTWIDTH] IN BLOOD BY AUTOMATED COUNT: 16 % (ref 11.6–17.2)
GFR SERPLBLD BASED ON 1.73 SQ M-ARVRAT: 263 ML/MIN (ref 89–?)
HCO3 BLD-SCNC: 23.8 MEQ/L (ref 21–32)
HCT VFR BLD CALC: 28.5 % (ref 35–46)
LITER FLOW: 15 L/M
MAGNESIUM SERPL-MCNC: 2.1 MG/DL (ref 1.5–2.5)
MCH RBC QN AUTO: 30.3 PG (ref 27–34)
MCHC RBC AUTO-ENTMCNC: 34.2 % (ref 32–36)
MCV RBC AUTO: 88.7 FL (ref 80–100)
METHGB MFR BLDA: 0.9 % (ref 0–2)
NUMBER OF ARTERIAL PUNCTURES: 1
OXYGEN DEVICE: (no result)
PLATELET # BLD: 248 TH/MM3 (ref 150–450)
PO2 BLD: 53 MMHG (ref 61–120)
POTASSIUM SERPL-SCNC: 3.1 MEQ/L (ref 3.5–5.1)
RBC # BLD AUTO: 3.21 MIL/MM3 (ref 4–5.3)
REVIEW FLAG: (no result)
SALICYLATES SERPL-MCNC: 10.6 G/DL (ref 12–16)
SODIUM SERPL-SCNC: 139 MEQ/L (ref 136–145)
STAT: YES
TEMP CORR TO: 98.6
ULNAR PULSE: (no result)
WBC # BLD AUTO: 14.8 TH/MM3 (ref 4–11)

## 2017-07-24 PROCEDURE — 5A1935Z RESPIRATORY VENTILATION, LESS THAN 24 CONSECUTIVE HOURS: ICD-10-PCS | Performed by: INTERNAL MEDICINE

## 2017-07-24 PROCEDURE — 0BH17EZ INSERTION OF ENDOTRACHEAL AIRWAY INTO TRACHEA, VIA NATURAL OR ARTIFICIAL OPENING: ICD-10-PCS | Performed by: INTERNAL MEDICINE

## 2017-07-24 RX ADMIN — WATER SCH ML: 1 IRRIGANT IRRIGATION at 08:40

## 2017-07-24 RX ADMIN — ACETYLCYSTEINE SCH ML: 100 SOLUTION ORAL; RESPIRATORY (INHALATION) at 22:40

## 2017-07-24 RX ADMIN — MORPHINE SULFATE PRN MG: 2 INJECTION, SOLUTION INTRAMUSCULAR; INTRAVENOUS at 19:57

## 2017-07-24 RX ADMIN — LEVETIRACETAM SCH MLS/HR: 100 INJECTION, SOLUTION, CONCENTRATE INTRAVENOUS at 08:40

## 2017-07-24 RX ADMIN — OXYCODONE HYDROCHLORIDE AND ACETAMINOPHEN PRN TAB: 5; 325 TABLET ORAL at 17:24

## 2017-07-24 RX ADMIN — Medication SCH ML: at 08:40

## 2017-07-24 RX ADMIN — ENOXAPARIN SODIUM SCH MG: 30 INJECTION SUBCUTANEOUS at 12:05

## 2017-07-24 RX ADMIN — OXYCODONE HYDROCHLORIDE AND ACETAMINOPHEN PRN TAB: 5; 325 TABLET ORAL at 12:04

## 2017-07-24 RX ADMIN — PROPOFOL SCH MLS/HR: 10 INJECTION, EMULSION INTRAVENOUS at 23:10

## 2017-07-24 RX ADMIN — MAGNESIUM HYDROXIDE SCH ML: 400 SUSPENSION ORAL at 21:00

## 2017-07-24 RX ADMIN — STANDARDIZED SENNA CONCENTRATE AND DOCUSATE SODIUM SCH TAB: 8.6; 5 TABLET, FILM COATED ORAL at 08:40

## 2017-07-24 RX ADMIN — OXYCODONE HYDROCHLORIDE AND ACETAMINOPHEN PRN TAB: 5; 325 TABLET ORAL at 03:32

## 2017-07-24 RX ADMIN — MORPHINE SULFATE PRN MG: 2 INJECTION, SOLUTION INTRAMUSCULAR; INTRAVENOUS at 06:00

## 2017-07-24 RX ADMIN — Medication SCH ML: at 21:00

## 2017-07-24 RX ADMIN — STANDARDIZED SENNA CONCENTRATE AND DOCUSATE SODIUM SCH TAB: 8.6; 5 TABLET, FILM COATED ORAL at 21:00

## 2017-07-24 RX ADMIN — IPRATROPIUM BROMIDE AND ALBUTEROL SULFATE SCH AMPULE: .5; 3 SOLUTION RESPIRATORY (INHALATION) at 22:41

## 2017-07-24 NOTE — RADRPT
EXAM DATE/TIME:  07/24/2017 21:40 

 

[facility] COMPARISON:     

CHEST SINGLE AP, July 24, 2017, 20:05.

 

                     

INDICATIONS :     

Post bronchoscopy.

                     

 

MEDICAL HISTORY :     

None.          

 

SURGICAL HISTORY :     

None.   

 

ENCOUNTER:     

Subsequent                                        

 

ACUITY:     

4 - 6 days      

 

PAIN SCORE:     

Non-responsive.

 

LOCATION:     

Bilateral chest 

 

FINDINGS:     

A single view of the chest demonstrates reexpansion of the right lung. Right basal atelectasis persis
ts. Left lung is clear. Endotracheal tube 5.5 cm above the [place].  Osseous structures are intact.

 

CONCLUSION:     

1. Right basilar atelectasis.

2. Reexpansion of the right lung.

 

 

 

 Rudy Alvarado MD on July 24, 2017 at 21:48           

Board Certified Radiologist.

 This report was verified electronically.
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
## 2017-07-24 NOTE — HHI.FPPN
Addendum to progress note


ADDENDUM


Reason for addendum:  Additonal documentation


Additional information


Subjective:


Resident's responded to [facility] at ~1930:


Per nursing staff at bedside, patient had become more tachycardic and hypoxic; 

she reportedly had O2 saturations and HR ~100 bpm tody, but recently became 

tachycardic to 110's and hypoxic to mid-80's. Patient also reported chest pain 

and anxiety.





Objective:


VS: 


-120   


's/100's


O2 saturations: mid 80's on nonrebreather


Gen: pain appears uncomfortable


Respiratory: Minimal breath sounds in R lung; L lung clear to auscultation. 

Normal RR


Cardiac: Tachycardia; normal peripheral perfusion


Abdominal: No pain to palpation or distension


Neuro: Patient alert, able to answer questions





A/P:


Respiratory distress


Impression: Hypoxia; O2 saturations in mid 80's on nonrebreather. Minimal R 

breath sounds


-Will check CXR


   -highly suspect visible pathology such as pneumothorax


-Will check ABG


-Attending physician notified of need for ICU transfer


-ICU physician notified by [facility] nurse of need for evaluation for intubation





Chest pain


Impression: Suspect secondary to respiratory etiology due to lack of R breath 

sounds


-EKG


-Troponin/cardiac enzymes





Other:


Per review of EMR/discussion with nursing staff, patient with recent admission 

for trauma; patient is s/p splenectomy for laceration. Patient has pubic rami 

fractures, subarachnoid bleeding, and hemoperitoneum which is being managed 

conservatively


Patient has been receiving Lovenox for DVT PPX, Percocet/Morphine for pain 

control





Discussed with ICU attending, Dr. Christianson, and Dr. Whatley


Remarks


CXR resulted as diffuse opacification of R lung with volume loss from likely 

atelectasis from mucus plugging.








Jarvis Friend MD R2 Jul 24, 2017 20:28

## 2017-07-24 NOTE — HHI.FF
Face to Face Verification


Diagnosis:  


(1) Subarachnoid hemorrhage


(2) Hemoperitoneum


(3) Acetabular fracture


(4) Lumbar transverse process fracture


(5) Major depressive disorder, recurrent episode, in partial remission with 

anxious distress


(6) Major neurocognitive disorder as late effect of traumatic brain injury 

without behavioral disturbance


Physical Therapy


Order:  Evaluate and Treat, Improve ambulation, Strength and gait training





Occupational Therapy


Order:  Evaluate and Treat, Improve ADL, Gross motor coordination, Fine motor 

coordination





Home Health Nursing


Order:     Medical education


      Signs/symptoms of disease process


      Medication education-adverse effect


      Nursing assessment with vital signs








I have seen patient Radha Isaac on 7/24/17. My clinical findings support the 

need for the requested home health care services because:


Ltd mobility - disease progression


Deconditioned w/ increased weakness


Limited ability to care for self


High risk of falls








I certify that my clinical findings support that this patient is homebound 

because:


Unsteady gait/balance


Unsafe to leave home unassisted


Unable to use public transportation








Norma Lucia Jul 24, 2017 10:57

## 2017-07-24 NOTE — PD.PROCEDR
Procedure Note


Procedure


Endotracheal Intubation


 


A time-out was completed verifying correct patient, procedure, site, positioning

, and special equipment if applicable. The patient was placed in a flat 

position. Sedation was obtained using  Etomidate 20mg. The patient was easily 

ventilated using an ambu bag. The [employer]/ MAC 4 BLADE  was used 

and inserted into the oropharynx at which time there was a Grade 1 view of the 

vocal cords. A 8-[place] endotracheal tube was inserted and visualized going 

through the vocal cords. The stylette was removed. Colorimetric change was 

visualized on the CO2 meter. Breath sounds were heard in both lung fields 

equally. The endotracheal tube was placed at 23 cm, measured at the teeth. 


A chest x-ray was ordered to assess for pneumothorax and verify 

endotrachealtube placement. 


Estimated Blood Loss: 0


The patient tolerated the procedure well and there were no complications.








Terry Aden MD Jul 24, 2017 21:50

## 2017-07-24 NOTE — RADRPT
EXAM DATE/TIME:  07/24/2017 20:05 

 

[facility] COMPARISON:     

CHEST SINGLE AP, July 21, 2017, 1:39.

 

                     

INDICATIONS :     

Respiratory failure

Hal[place]at

                     

 

MEDICAL HISTORY :     

None.          

 

SURGICAL HISTORY :     

None.   

 

ENCOUNTER:     

Subsequent                                        

 

ACUITY:     

4 - 6 days      

 

PAIN SCORE:     

Non-responsive.

 

LOCATION:      

chest 

 

FINDINGS:     

A single view of the chest demonstrates a consolidation right lung with volume loss. Left lung clear.
 Heart normal in size..  Osseous structures are intact.

 

CONCLUSION:     

Diffuse opacification right lung with volume loss likely atelectasis from mucous plugging.

 

 

 

 Rudy Alvarado MD on July 24, 2017 at 20:28           

Board Certified Radiologist.

 This report was verified electronically.
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
## 2017-07-25 VITALS
HEART RATE: 108 BPM | DIASTOLIC BLOOD PRESSURE: 84 MMHG | TEMPERATURE: 99.8 F | SYSTOLIC BLOOD PRESSURE: 119 MMHG | RESPIRATION RATE: 16 BRPM | OXYGEN SATURATION: 100 %

## 2017-07-25 VITALS — OXYGEN SATURATION: 100 %

## 2017-07-25 VITALS
HEART RATE: 80 BPM | OXYGEN SATURATION: 99 % | RESPIRATION RATE: 24 BRPM | SYSTOLIC BLOOD PRESSURE: 123 MMHG | DIASTOLIC BLOOD PRESSURE: 79 MMHG | TEMPERATURE: 98 F

## 2017-07-25 VITALS
TEMPERATURE: 98.7 F | OXYGEN SATURATION: 100 % | SYSTOLIC BLOOD PRESSURE: 124 MMHG | DIASTOLIC BLOOD PRESSURE: 72 MMHG | HEART RATE: 86 BPM | RESPIRATION RATE: 18 BRPM

## 2017-07-25 VITALS
TEMPERATURE: 99.4 F | SYSTOLIC BLOOD PRESSURE: 119 MMHG | HEART RATE: 92 BPM | RESPIRATION RATE: 18 BRPM | DIASTOLIC BLOOD PRESSURE: 81 MMHG | OXYGEN SATURATION: 100 %

## 2017-07-25 VITALS — HEART RATE: 104 BPM

## 2017-07-25 VITALS — HEART RATE: 102 BPM

## 2017-07-25 VITALS
OXYGEN SATURATION: 100 % | DIASTOLIC BLOOD PRESSURE: 85 MMHG | RESPIRATION RATE: 16 BRPM | TEMPERATURE: 98.8 F | SYSTOLIC BLOOD PRESSURE: 120 MMHG | HEART RATE: 103 BPM

## 2017-07-25 VITALS — HEART RATE: 92 BPM

## 2017-07-25 VITALS — HEART RATE: 86 BPM

## 2017-07-25 VITALS
RESPIRATION RATE: 16 BRPM | TEMPERATURE: 98.7 F | HEART RATE: 101 BPM | OXYGEN SATURATION: 100 % | DIASTOLIC BLOOD PRESSURE: 73 MMHG | SYSTOLIC BLOOD PRESSURE: 109 MMHG

## 2017-07-25 VITALS — HEART RATE: 80 BPM

## 2017-07-25 VITALS — OXYGEN SATURATION: 97 %

## 2017-07-25 VITALS — HEART RATE: 106 BPM

## 2017-07-25 VITALS — OXYGEN SATURATION: 98 %

## 2017-07-25 LAB
ALP SERPL-CCNC: 69 U/L (ref 45–117)
ALT SERPL-CCNC: 30 U/L (ref 10–53)
ANION GAP SERPL CALC-SCNC: 10 MEQ/L (ref 5–15)
AST SERPL-CCNC: 23 U/L (ref 15–37)
BASE EXCESS BLD CALC-SCNC: -0.5 MMOL/L (ref -2–2)
BASE EXCESS BLD CALC-SCNC: -0.7 MMOL/L (ref -2–2)
BASOPHILS # BLD AUTO: 0.1 TH/MM3 (ref 0–0.2)
BASOPHILS NFR BLD: 0.5 % (ref 0–2)
BENZODIAZEPINES PNL UR: 94 % (ref 90–100)
BENZODIAZEPINES PNL UR: 98 % (ref 90–100)
BILIRUB SERPL-MCNC: 0.3 MG/DL (ref 0.2–1)
BLOOD GAS CARBOXYHEMOGLOBIN: 0.9 % (ref 0–4)
BLOOD GAS CARBOXYHEMOGLOBIN: 1.3 % (ref 0–4)
BLOOD GAS HCO3: 23 MMOL/L (ref 22–26)
BLOOD GAS HCO3: 23 MMOL/L (ref 22–26)
BLOOD GAS OXYGEN CONTENT: 12.9 VOL % (ref 12–20)
BLOOD GAS OXYGEN CONTENT: 14.5 VOL % (ref 12–20)
BLOOD GAS PCO2: 35 MMHG (ref 38–42)
BLOOD GAS PCO2: 35 MMHG (ref 38–42)
BUN SERPL-MCNC: 8 MG/DL (ref 7–18)
CHLORIDE SERPL-SCNC: 107 MEQ/L (ref 98–107)
CK SERPL-CCNC: 82 U/L (ref 26–192)
CRITICAL VALUE: NO
CRITICAL VALUE: NO
DRAW SITE: (no result)
DRAW SITE: (no result)
EOSINOPHIL # BLD: 0.8 TH/MM3 (ref 0–0.4)
EOSINOPHIL NFR BLD: 5.9 % (ref 0–4)
ERYTHROCYTE [DISTWIDTH] IN BLOOD BY AUTOMATED COUNT: 16.1 % (ref 11.6–17.2)
GFR SERPLBLD BASED ON 1.73 SQ M-ARVRAT: 227 ML/MIN (ref 89–?)
HCO3 BLD-SCNC: 23.5 MEQ/L (ref 21–32)
HCT VFR BLD CALC: 27.8 % (ref 35–46)
HEMO FLAGS: (no result)
LYMPHOCYTES # BLD AUTO: 1.6 TH/MM3 (ref 1–4.8)
LYMPHOCYTES NFR BLD AUTO: 12.5 % (ref 9–44)
MAGNESIUM SERPL-MCNC: 2.2 MG/DL (ref 1.5–2.5)
MCH RBC QN AUTO: 30.4 PG (ref 27–34)
MCHC RBC AUTO-ENTMCNC: 34.5 % (ref 32–36)
MCV RBC AUTO: 88.1 FL (ref 80–100)
METHGB MFR BLDA: 0.9 % (ref 0–2)
METHGB MFR BLDA: 0.9 % (ref 0–2)
MONOCYTES NFR BLD: 7 % (ref 0–8)
NEUTROPHILS # BLD AUTO: 9.6 TH/MM3 (ref 1.8–7.7)
NEUTROPHILS NFR BLD AUTO: 74.1 % (ref 16–70)
NUMBER OF ARTERIAL PUNCTURES: 1
NUMBER OF ARTERIAL PUNCTURES: 1
O2/TOTAL GAS SETTING VFR VENT: 100 %
O2/TOTAL GAS SETTING VFR VENT: 35 %
OXYGEN DEVICE: (no result)
OXYGEN DEVICE: (no result)
PLATELET # BLD: 355 TH/MM3 (ref 150–450)
PO2 BLD: 418 MMHG (ref 61–120)
PO2 BLD: 83 MMHG (ref 61–120)
POTASSIUM SERPL-SCNC: 3.6 MEQ/L (ref 3.5–5.1)
RBC # BLD AUTO: 3.16 MIL/MM3 (ref 4–5.3)
SALICYLATES SERPL-MCNC: 9.6 G/DL (ref 12–16)
SALICYLATES SERPL-MCNC: 9.7 G/DL (ref 12–16)
SODIUM SERPL-SCNC: 140 MEQ/L (ref 136–145)
STAT: NO
STAT: NO
TEMP CORR TO: 98.6
TEMP CORR TO: 98.6
ULNAR PULSE: PRESENT
ULNAR PULSE: PRESENT
VENT SETTINGS: (no result)
VENT SETTINGS: (no result)
WBC # BLD AUTO: 13 TH/MM3 (ref 4–11)

## 2017-07-25 PROCEDURE — 0BC58ZZ EXTIRPATION OF MATTER FROM RIGHT MIDDLE LOBE BRONCHUS, VIA NATURAL OR ARTIFICIAL OPENING ENDOSCOPIC: ICD-10-PCS | Performed by: INTERNAL MEDICINE

## 2017-07-25 PROCEDURE — 0BC68ZZ EXTIRPATION OF MATTER FROM RIGHT LOWER LOBE BRONCHUS, VIA NATURAL OR ARTIFICIAL OPENING ENDOSCOPIC: ICD-10-PCS | Performed by: INTERNAL MEDICINE

## 2017-07-25 PROCEDURE — 0B9C8ZX DRAINAGE OF RIGHT UPPER LUNG LOBE, VIA NATURAL OR ARTIFICIAL OPENING ENDOSCOPIC, DIAGNOSTIC: ICD-10-PCS | Performed by: INTERNAL MEDICINE

## 2017-07-25 RX ADMIN — IPRATROPIUM BROMIDE AND ALBUTEROL SULFATE SCH AMPULE: .5; 3 SOLUTION RESPIRATORY (INHALATION) at 03:26

## 2017-07-25 RX ADMIN — LEVETIRACETAM SCH MLS/HR: 100 INJECTION, SOLUTION, CONCENTRATE INTRAVENOUS at 21:00

## 2017-07-25 RX ADMIN — LEVETIRACETAM SCH MLS/HR: 100 INJECTION, SOLUTION, CONCENTRATE INTRAVENOUS at 03:27

## 2017-07-25 RX ADMIN — ENOXAPARIN SODIUM SCH MG: 30 INJECTION SUBCUTANEOUS at 03:43

## 2017-07-25 RX ADMIN — POLYVINYL ALCOHOL SCH DROP: 14 SOLUTION/ DROPS OPHTHALMIC at 15:01

## 2017-07-25 RX ADMIN — Medication SCH ML: at 08:49

## 2017-07-25 RX ADMIN — SODIUM CHLORIDE SOLN NEBU 3% SCH ML: 3 NEBU SOLN at 21:28

## 2017-07-25 RX ADMIN — IPRATROPIUM BROMIDE AND ALBUTEROL SULFATE SCH AMPULE: .5; 3 SOLUTION RESPIRATORY (INHALATION) at 07:32

## 2017-07-25 RX ADMIN — WATER SCH ML: 1 IRRIGANT IRRIGATION at 08:49

## 2017-07-25 RX ADMIN — Medication SCH ML: at 21:00

## 2017-07-25 RX ADMIN — LEVOFLOXACIN SCH MLS/HR: 5 INJECTION, SOLUTION INTRAVENOUS at 03:42

## 2017-07-25 RX ADMIN — LEVETIRACETAM SCH MLS/HR: 100 INJECTION, SOLUTION, CONCENTRATE INTRAVENOUS at 08:48

## 2017-07-25 RX ADMIN — PHENYTOIN SODIUM SCH MLS/HR: 50 INJECTION INTRAMUSCULAR; INTRAVENOUS at 17:12

## 2017-07-25 RX ADMIN — PANTOPRAZOLE SODIUM SCH MG: 40 INJECTION, POWDER, FOR SOLUTION INTRAVENOUS at 08:48

## 2017-07-25 RX ADMIN — SODIUM CHLORIDE SOLN NEBU 3% SCH ML: 3 NEBU SOLN at 15:45

## 2017-07-25 RX ADMIN — VANCOMYCIN HYDROCHLORIDE SCH MLS/HR: 1 INJECTION, SOLUTION INTRAVENOUS at 15:44

## 2017-07-25 RX ADMIN — POLYVINYL ALCOHOL SCH DROP: 14 SOLUTION/ DROPS OPHTHALMIC at 08:00

## 2017-07-25 RX ADMIN — MORPHINE SULFATE PRN MG: 2 INJECTION, SOLUTION INTRAMUSCULAR; INTRAVENOUS at 11:28

## 2017-07-25 RX ADMIN — PHENYTOIN SODIUM SCH MLS/HR: 50 INJECTION INTRAMUSCULAR; INTRAVENOUS at 08:48

## 2017-07-25 RX ADMIN — POLYVINYL ALCOHOL SCH DROP: 14 SOLUTION/ DROPS OPHTHALMIC at 22:00

## 2017-07-25 RX ADMIN — ENOXAPARIN SODIUM SCH MG: 30 INJECTION SUBCUTANEOUS at 10:57

## 2017-07-25 RX ADMIN — IPRATROPIUM BROMIDE AND ALBUTEROL SULFATE SCH AMPULE: .5; 3 SOLUTION RESPIRATORY (INHALATION) at 15:45

## 2017-07-25 RX ADMIN — HUMAN INSULIN SCH: 100 INJECTION, SOLUTION SUBCUTANEOUS at 12:00

## 2017-07-25 RX ADMIN — ACETYLCYSTEINE SCH ML: 100 SOLUTION ORAL; RESPIRATORY (INHALATION) at 03:26

## 2017-07-25 RX ADMIN — PROPOFOL SCH MLS/HR: 10 INJECTION, EMULSION INTRAVENOUS at 03:44

## 2017-07-25 RX ADMIN — HUMAN INSULIN SCH: 100 INJECTION, SOLUTION SUBCUTANEOUS at 18:00

## 2017-07-25 RX ADMIN — MORPHINE SULFATE PRN MG: 2 INJECTION, SOLUTION INTRAMUSCULAR; INTRAVENOUS at 18:52

## 2017-07-25 RX ADMIN — STANDARDIZED SENNA CONCENTRATE AND DOCUSATE SODIUM SCH TAB: 8.6; 5 TABLET, FILM COATED ORAL at 08:49

## 2017-07-25 RX ADMIN — MORPHINE SULFATE PRN MG: 2 INJECTION, SOLUTION INTRAMUSCULAR; INTRAVENOUS at 09:05

## 2017-07-25 RX ADMIN — ENOXAPARIN SODIUM SCH MG: 30 INJECTION SUBCUTANEOUS at 23:30

## 2017-07-25 RX ADMIN — STANDARDIZED SENNA CONCENTRATE AND DOCUSATE SODIUM SCH TAB: 8.6; 5 TABLET, FILM COATED ORAL at 21:00

## 2017-07-25 RX ADMIN — MAGNESIUM HYDROXIDE SCH ML: 400 SUSPENSION ORAL at 21:00

## 2017-07-25 RX ADMIN — MORPHINE SULFATE PRN MG: 2 INJECTION, SOLUTION INTRAMUSCULAR; INTRAVENOUS at 14:46

## 2017-07-25 RX ADMIN — IPRATROPIUM BROMIDE AND ALBUTEROL SULFATE SCH AMPULE: .5; 3 SOLUTION RESPIRATORY (INHALATION) at 21:28

## 2017-07-25 NOTE — EKG
Date Performed: 07/24/2017       Time Performed: 21:32:36

 

PTAGE:      36 years

 

EKG:      SINUS TACHYCARDIA NONSPECIFIC ST & T-WAVE ABNORMALITY ABNORMAL RHYTHM ECG 

 

NO PREVIOUS TRACING            

 

DOCTOR:   Conor Alarcon  Interpretating Date/Time  07/25/2017 11:48:44
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
## 2017-07-25 NOTE — RADRPT
EXAM DATE/TIME:  07/25/2017 04:56 

 

[facility] COMPARISON:     

CHEST SINGLE AP, July 24, 2017, 21:40.

 

                     

INDICATIONS :     

Respiratory failure. 

                     

 

MEDICAL HISTORY :     

None.          

 

SURGICAL HISTORY :     

None.   

 

ENCOUNTER:     

Subsequent                                        

 

ACUITY:     

4 - 6 days      

 

PAIN SCORE:     

Non-responsive.

 

LOCATION:     

Bilateral chest 

 

FINDINGS:     

Portable AP view of the chest demonstrates a normal-sized cardiac silhouette. ETT and nasogastric tub
e are present. No effusion, consolidation, or pneumothorax is seen. There is atelectasis at the right
 base.

 

CONCLUSION:     

Improved aeration at the right lung base with mild residual atelectasis.

 

 

 

 Addy Joiner MD on July 25, 2017 at 6:18           

Board Certified Radiologist.

 This report was verified electronically.

## 2017-07-25 NOTE — PD.PROCEDR
Procedure Note


Procedure


Bronchoscopy





The bronchoscope was advanced through the ETT into [place]ol[place], which was sharp. 

Then advanced into the left main stem and each segment, subsegement in the left 

upper lingula and lower lobe was visualized. There was mild tracheobronchitis 

with mild friability throughout. There was modest amounts of white secretion. 

There were no other findings including evidence of mass, anatomic distortions, 

or hemorrhage. 


The right upper lobe was completely obstructed with thick white mucus extending 

in the subsegments of the anteroapical and posterior segments. No specific 

masses or other lesions were identified throughout the tracheobronchial tree on 

the right. There was mild tracheal bronchitis with friability. Upon coughing, 

there was punctate hemorrhage. The bronchoscope was then advanced through the 

bronchus intermedius and the right middle lobe and right lower lobe. These 

again had thick white mucus obstructing airways.  This was cleaned with 

multiple washout and suctioning, multiple times obstructing the bronchoscope 

canal.  After cleaning and reopening all subsegmental airways the bronchoscope 

was withdrawn and the area was suctioned clear. The bronchoscope was then 

advanced into the apical segment of the right upper lobe and the 

bronchioalveolar lavage again performed. Samples were taken and the 

bronchoscope was removed suctioned the area clear. The bronchoscope was then 

withdrawn. The patient tolerated the procedure well without evidence of 

desaturation or complications.  The postprocedure x-ray confirmed significant 

improvement in irritation of both lungs.








Terry Aden MD Jul 25, 2017 03:16
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
## 2017-07-26 VITALS
OXYGEN SATURATION: 100 % | RESPIRATION RATE: 10 BRPM | TEMPERATURE: 98.2 F | DIASTOLIC BLOOD PRESSURE: 65 MMHG | HEART RATE: 95 BPM | SYSTOLIC BLOOD PRESSURE: 111 MMHG

## 2017-07-26 VITALS
SYSTOLIC BLOOD PRESSURE: 118 MMHG | DIASTOLIC BLOOD PRESSURE: 71 MMHG | RESPIRATION RATE: 16 BRPM | HEART RATE: 80 BPM | TEMPERATURE: 97.8 F | OXYGEN SATURATION: 98 %

## 2017-07-26 VITALS
DIASTOLIC BLOOD PRESSURE: 68 MMHG | TEMPERATURE: 98.4 F | HEART RATE: 88 BPM | SYSTOLIC BLOOD PRESSURE: 126 MMHG | OXYGEN SATURATION: 100 % | RESPIRATION RATE: 13 BRPM

## 2017-07-26 VITALS — HEART RATE: 88 BPM

## 2017-07-26 VITALS
DIASTOLIC BLOOD PRESSURE: 71 MMHG | RESPIRATION RATE: 19 BRPM | HEART RATE: 97 BPM | TEMPERATURE: 98.3 F | SYSTOLIC BLOOD PRESSURE: 117 MMHG | OXYGEN SATURATION: 96 %

## 2017-07-26 VITALS
TEMPERATURE: 98.2 F | HEART RATE: 100 BPM | OXYGEN SATURATION: 100 % | RESPIRATION RATE: 20 BRPM | DIASTOLIC BLOOD PRESSURE: 63 MMHG | SYSTOLIC BLOOD PRESSURE: 102 MMHG

## 2017-07-26 VITALS
TEMPERATURE: 98.2 F | RESPIRATION RATE: 22 BRPM | HEART RATE: 88 BPM | OXYGEN SATURATION: 100 % | SYSTOLIC BLOOD PRESSURE: 113 MMHG | DIASTOLIC BLOOD PRESSURE: 72 MMHG

## 2017-07-26 VITALS
HEART RATE: 78 BPM | SYSTOLIC BLOOD PRESSURE: 112 MMHG | DIASTOLIC BLOOD PRESSURE: 70 MMHG | OXYGEN SATURATION: 96 % | RESPIRATION RATE: 17 BRPM | TEMPERATURE: 98.2 F

## 2017-07-26 VITALS — OXYGEN SATURATION: 100 %

## 2017-07-26 VITALS — HEART RATE: 96 BPM

## 2017-07-26 VITALS — HEART RATE: 94 BPM

## 2017-07-26 VITALS — OXYGEN SATURATION: 92 %

## 2017-07-26 VITALS — OXYGEN SATURATION: 99 %

## 2017-07-26 VITALS — HEART RATE: 90 BPM

## 2017-07-26 LAB
ALP SERPL-CCNC: 71 U/L (ref 45–117)
ALT SERPL-CCNC: 27 U/L (ref 10–53)
ANION GAP SERPL CALC-SCNC: 9 MEQ/L (ref 5–15)
AST SERPL-CCNC: 17 U/L (ref 15–37)
BASOPHILS # BLD AUTO: 0.1 TH/MM3 (ref 0–0.2)
BASOPHILS NFR BLD: 0.8 % (ref 0–2)
BILIRUB SERPL-MCNC: 0.3 MG/DL (ref 0.2–1)
BUN SERPL-MCNC: 10 MG/DL (ref 7–18)
CHLORIDE SERPL-SCNC: 105 MEQ/L (ref 98–107)
EOSINOPHIL # BLD: 0.9 TH/MM3 (ref 0–0.4)
EOSINOPHIL NFR BLD: 7.7 % (ref 0–4)
ERYTHROCYTE [DISTWIDTH] IN BLOOD BY AUTOMATED COUNT: 16.5 % (ref 11.6–17.2)
GFR SERPLBLD BASED ON 1.73 SQ M-ARVRAT: 286 ML/MIN (ref 89–?)
HCO3 BLD-SCNC: 27 MEQ/L (ref 21–32)
HCT VFR BLD CALC: 27.6 % (ref 35–46)
HEMO FLAGS: (no result)
LYMPHOCYTES # BLD AUTO: 1.2 TH/MM3 (ref 1–4.8)
LYMPHOCYTES NFR BLD AUTO: 10.3 % (ref 9–44)
MAGNESIUM SERPL-MCNC: 2.3 MG/DL (ref 1.5–2.5)
MCH RBC QN AUTO: 30 PG (ref 27–34)
MCHC RBC AUTO-ENTMCNC: 33.8 % (ref 32–36)
MCV RBC AUTO: 88.8 FL (ref 80–100)
MONOCYTES NFR BLD: 10.2 % (ref 0–8)
NEUTROPHILS # BLD AUTO: 8.4 TH/MM3 (ref 1.8–7.7)
NEUTROPHILS NFR BLD AUTO: 71 % (ref 16–70)
PLATELET # BLD: 434 TH/MM3 (ref 150–450)
POTASSIUM SERPL-SCNC: 3.8 MEQ/L (ref 3.5–5.1)
RBC # BLD AUTO: 3.11 MIL/MM3 (ref 4–5.3)
SODIUM SERPL-SCNC: 141 MEQ/L (ref 136–145)
WBC # BLD AUTO: 11.8 TH/MM3 (ref 4–11)

## 2017-07-26 RX ADMIN — VANCOMYCIN HYDROCHLORIDE SCH MLS/HR: 1 INJECTION, SOLUTION INTRAVENOUS at 17:59

## 2017-07-26 RX ADMIN — LEVETIRACETAM SCH MLS/HR: 100 INJECTION, SOLUTION, CONCENTRATE INTRAVENOUS at 09:15

## 2017-07-26 RX ADMIN — IPRATROPIUM BROMIDE AND ALBUTEROL SULFATE SCH AMPULE: .5; 3 SOLUTION RESPIRATORY (INHALATION) at 16:52

## 2017-07-26 RX ADMIN — POLYVINYL ALCOHOL SCH DROP: 14 SOLUTION/ DROPS OPHTHALMIC at 14:00

## 2017-07-26 RX ADMIN — MORPHINE SULFATE PRN MG: 2 INJECTION, SOLUTION INTRAMUSCULAR; INTRAVENOUS at 17:58

## 2017-07-26 RX ADMIN — HUMAN INSULIN SCH: 100 INJECTION, SOLUTION SUBCUTANEOUS at 00:00

## 2017-07-26 RX ADMIN — SODIUM CHLORIDE SOLN NEBU 3% SCH ML: 3 NEBU SOLN at 10:02

## 2017-07-26 RX ADMIN — Medication SCH ML: at 21:16

## 2017-07-26 RX ADMIN — STANDARDIZED SENNA CONCENTRATE AND DOCUSATE SODIUM SCH TAB: 8.6; 5 TABLET, FILM COATED ORAL at 21:16

## 2017-07-26 RX ADMIN — POLYVINYL ALCOHOL SCH DROP: 14 SOLUTION/ DROPS OPHTHALMIC at 05:40

## 2017-07-26 RX ADMIN — STANDARDIZED SENNA CONCENTRATE AND DOCUSATE SODIUM SCH TAB: 8.6; 5 TABLET, FILM COATED ORAL at 09:15

## 2017-07-26 RX ADMIN — MORPHINE SULFATE PRN MG: 2 INJECTION, SOLUTION INTRAMUSCULAR; INTRAVENOUS at 21:15

## 2017-07-26 RX ADMIN — ENOXAPARIN SODIUM SCH MG: 30 INJECTION SUBCUTANEOUS at 15:59

## 2017-07-26 RX ADMIN — SODIUM CHLORIDE SOLN NEBU 3% SCH ML: 3 NEBU SOLN at 20:36

## 2017-07-26 RX ADMIN — OXYCODONE HYDROCHLORIDE AND ACETAMINOPHEN PRN TAB: 5; 325 TABLET ORAL at 15:59

## 2017-07-26 RX ADMIN — WATER SCH ML: 1 IRRIGANT IRRIGATION at 09:15

## 2017-07-26 RX ADMIN — HUMAN INSULIN SCH: 100 INJECTION, SOLUTION SUBCUTANEOUS at 13:00

## 2017-07-26 RX ADMIN — PANTOPRAZOLE SODIUM SCH MG: 40 INJECTION, POWDER, FOR SOLUTION INTRAVENOUS at 05:40

## 2017-07-26 RX ADMIN — POLYVINYL ALCOHOL SCH DROP: 14 SOLUTION/ DROPS OPHTHALMIC at 21:31

## 2017-07-26 RX ADMIN — LEVOFLOXACIN SCH MLS/HR: 5 INJECTION, SOLUTION INTRAVENOUS at 00:45

## 2017-07-26 RX ADMIN — VANCOMYCIN HYDROCHLORIDE SCH MLS/HR: 1 INJECTION, SOLUTION INTRAVENOUS at 03:19

## 2017-07-26 RX ADMIN — MORPHINE SULFATE PRN MG: 2 INJECTION, SOLUTION INTRAMUSCULAR; INTRAVENOUS at 05:40

## 2017-07-26 RX ADMIN — SODIUM CHLORIDE SOLN NEBU 3% SCH ML: 3 NEBU SOLN at 03:51

## 2017-07-26 RX ADMIN — MAGNESIUM HYDROXIDE SCH ML: 400 SUSPENSION ORAL at 21:00

## 2017-07-26 RX ADMIN — IPRATROPIUM BROMIDE AND ALBUTEROL SULFATE SCH AMPULE: .5; 3 SOLUTION RESPIRATORY (INHALATION) at 20:31

## 2017-07-26 RX ADMIN — IPRATROPIUM BROMIDE AND ALBUTEROL SULFATE SCH AMPULE: .5; 3 SOLUTION RESPIRATORY (INHALATION) at 03:51

## 2017-07-26 RX ADMIN — SODIUM CHLORIDE SOLN NEBU 3% SCH ML: 3 NEBU SOLN at 16:52

## 2017-07-26 RX ADMIN — HUMAN INSULIN SCH: 100 INJECTION, SOLUTION SUBCUTANEOUS at 17:57

## 2017-07-26 RX ADMIN — IPRATROPIUM BROMIDE AND ALBUTEROL SULFATE SCH AMPULE: .5; 3 SOLUTION RESPIRATORY (INHALATION) at 09:30

## 2017-07-26 RX ADMIN — MORPHINE SULFATE PRN MG: 2 INJECTION, SOLUTION INTRAMUSCULAR; INTRAVENOUS at 10:32

## 2017-07-26 RX ADMIN — Medication SCH ML: at 09:00

## 2017-07-26 RX ADMIN — HUMAN INSULIN SCH: 100 INJECTION, SOLUTION SUBCUTANEOUS at 06:00
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
## 2017-07-26 NOTE — RADRPT
EXAM DATE/TIME:  07/26/2017 04:28 

 

[facility] COMPARISON:     

CHEST SINGLE AP, July 25, 2017, 4:56.

 

                     

INDICATIONS :     

Shortness of breath.

                     

 

MEDICAL HISTORY :     

None.          

 

SURGICAL HISTORY :     

None.   

 

ENCOUNTER:     

Subsequent                                        

 

ACUITY:     

4 - 6 days      

 

PAIN SCORE:     

Non-responsive.

 

LOCATION:     

Bilateral chest 

 

FINDINGS:     

Portable AP view of the chest demonstrates a normal-sized cardiac silhouette. A nasogastric tube exte
nds into the distal esophagus but has not crossed the GE junction. No effusion, consolidation, or pne
umothorax is identified.

 

CONCLUSION:     

Nasogastric tube tip is in the distal esophagus and should be advanced. Otherwise, no acute finding i
s identified.

 

 

 

 Addy Joiner MD on July 26, 2017 at 6:36           

Board Certified Radiologist.

 This report was verified electronically.
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
## 2017-07-27 VITALS
HEART RATE: 86 BPM | OXYGEN SATURATION: 97 % | SYSTOLIC BLOOD PRESSURE: 115 MMHG | RESPIRATION RATE: 20 BRPM | DIASTOLIC BLOOD PRESSURE: 70 MMHG | TEMPERATURE: 97.7 F

## 2017-07-27 VITALS
RESPIRATION RATE: 20 BRPM | DIASTOLIC BLOOD PRESSURE: 71 MMHG | TEMPERATURE: 97 F | HEART RATE: 97 BPM | SYSTOLIC BLOOD PRESSURE: 115 MMHG | OXYGEN SATURATION: 97 %

## 2017-07-27 VITALS
DIASTOLIC BLOOD PRESSURE: 79 MMHG | RESPIRATION RATE: 20 BRPM | OXYGEN SATURATION: 97 % | TEMPERATURE: 97.9 F | HEART RATE: 88 BPM | SYSTOLIC BLOOD PRESSURE: 121 MMHG

## 2017-07-27 VITALS
OXYGEN SATURATION: 98 % | DIASTOLIC BLOOD PRESSURE: 69 MMHG | SYSTOLIC BLOOD PRESSURE: 110 MMHG | RESPIRATION RATE: 16 BRPM | TEMPERATURE: 97.2 F | HEART RATE: 88 BPM

## 2017-07-27 VITALS — HEART RATE: 86 BPM

## 2017-07-27 VITALS
RESPIRATION RATE: 16 BRPM | HEART RATE: 84 BPM | TEMPERATURE: 98.7 F | DIASTOLIC BLOOD PRESSURE: 77 MMHG | OXYGEN SATURATION: 97 % | SYSTOLIC BLOOD PRESSURE: 114 MMHG

## 2017-07-27 VITALS
SYSTOLIC BLOOD PRESSURE: 110 MMHG | OXYGEN SATURATION: 93 % | HEART RATE: 91 BPM | DIASTOLIC BLOOD PRESSURE: 70 MMHG | RESPIRATION RATE: 17 BRPM | TEMPERATURE: 98.2 F

## 2017-07-27 VITALS — OXYGEN SATURATION: 93 %

## 2017-07-27 VITALS — OXYGEN SATURATION: 96 %

## 2017-07-27 VITALS — OXYGEN SATURATION: 95 %

## 2017-07-27 LAB
ALP SERPL-CCNC: 80 U/L (ref 45–117)
ALT SERPL-CCNC: 38 U/L (ref 10–53)
ANION GAP SERPL CALC-SCNC: 9 MEQ/L (ref 5–15)
AST SERPL-CCNC: 18 U/L (ref 15–37)
BASOPHILS # BLD AUTO: 0.1 TH/MM3 (ref 0–0.2)
BASOPHILS NFR BLD: 0.7 % (ref 0–2)
BILIRUB SERPL-MCNC: 0.8 MG/DL (ref 0.2–1)
BUN SERPL-MCNC: 9 MG/DL (ref 7–18)
CHLORIDE SERPL-SCNC: 106 MEQ/L (ref 98–107)
EOSINOPHIL # BLD: 0.8 TH/MM3 (ref 0–0.4)
EOSINOPHIL NFR BLD: 7.1 % (ref 0–4)
ERYTHROCYTE [DISTWIDTH] IN BLOOD BY AUTOMATED COUNT: 16.1 % (ref 11.6–17.2)
GFR SERPLBLD BASED ON 1.73 SQ M-ARVRAT: 274 ML/MIN (ref 89–?)
HCO3 BLD-SCNC: 24.7 MEQ/L (ref 21–32)
HCT VFR BLD CALC: 27.7 % (ref 35–46)
HEMO FLAGS: (no result)
LYMPHOCYTES # BLD AUTO: 1 TH/MM3 (ref 1–4.8)
LYMPHOCYTES NFR BLD AUTO: 8.8 % (ref 9–44)
MAGNESIUM SERPL-MCNC: 2.2 MG/DL (ref 1.5–2.5)
MCH RBC QN AUTO: 30.3 PG (ref 27–34)
MCHC RBC AUTO-ENTMCNC: 33.8 % (ref 32–36)
MCV RBC AUTO: 89.5 FL (ref 80–100)
MONOCYTES NFR BLD: 8.1 % (ref 0–8)
NEUTROPHILS # BLD AUTO: 8.6 TH/MM3 (ref 1.8–7.7)
NEUTROPHILS NFR BLD AUTO: 75.3 % (ref 16–70)
PLATELET # BLD: 607 TH/MM3 (ref 150–450)
POTASSIUM SERPL-SCNC: 4 MEQ/L (ref 3.5–5.1)
RBC # BLD AUTO: 3.1 MIL/MM3 (ref 4–5.3)
SODIUM SERPL-SCNC: 140 MEQ/L (ref 136–145)
WBC # BLD AUTO: 11.4 TH/MM3 (ref 4–11)

## 2017-07-27 RX ADMIN — ENOXAPARIN SODIUM SCH MG: 30 INJECTION SUBCUTANEOUS at 00:17

## 2017-07-27 RX ADMIN — IPRATROPIUM BROMIDE AND ALBUTEROL SULFATE SCH AMPULE: .5; 3 SOLUTION RESPIRATORY (INHALATION) at 03:33

## 2017-07-27 RX ADMIN — IPRATROPIUM BROMIDE AND ALBUTEROL SULFATE SCH AMPULE: .5; 3 SOLUTION RESPIRATORY (INHALATION) at 15:55

## 2017-07-27 RX ADMIN — VANCOMYCIN HYDROCHLORIDE SCH MLS/HR: 1 INJECTION, SOLUTION INTRAVENOUS at 09:06

## 2017-07-27 RX ADMIN — HUMAN INSULIN SCH: 100 INJECTION, SOLUTION SUBCUTANEOUS at 06:00

## 2017-07-27 RX ADMIN — STANDARDIZED SENNA CONCENTRATE AND DOCUSATE SODIUM SCH TAB: 8.6; 5 TABLET, FILM COATED ORAL at 08:57

## 2017-07-27 RX ADMIN — Medication SCH ML: at 22:51

## 2017-07-27 RX ADMIN — OXYCODONE HYDROCHLORIDE AND ACETAMINOPHEN PRN TAB: 5; 325 TABLET ORAL at 22:41

## 2017-07-27 RX ADMIN — MORPHINE SULFATE PRN MG: 2 INJECTION, SOLUTION INTRAMUSCULAR; INTRAVENOUS at 05:57

## 2017-07-27 RX ADMIN — ENOXAPARIN SODIUM SCH MG: 30 INJECTION SUBCUTANEOUS at 10:12

## 2017-07-27 RX ADMIN — SODIUM CHLORIDE SOLN NEBU 3% SCH ML: 3 NEBU SOLN at 15:55

## 2017-07-27 RX ADMIN — STANDARDIZED SENNA CONCENTRATE AND DOCUSATE SODIUM SCH TAB: 8.6; 5 TABLET, FILM COATED ORAL at 22:41

## 2017-07-27 RX ADMIN — OXYCODONE HYDROCHLORIDE AND ACETAMINOPHEN PRN TAB: 5; 325 TABLET ORAL at 17:51

## 2017-07-27 RX ADMIN — HUMAN INSULIN SCH: 100 INJECTION, SOLUTION SUBCUTANEOUS at 11:52

## 2017-07-27 RX ADMIN — SODIUM CHLORIDE SOLN NEBU 3% SCH ML: 3 NEBU SOLN at 21:28

## 2017-07-27 RX ADMIN — PANTOPRAZOLE SODIUM SCH MG: 40 INJECTION, POWDER, FOR SOLUTION INTRAVENOUS at 05:54

## 2017-07-27 RX ADMIN — POLYVINYL ALCOHOL SCH DROP: 14 SOLUTION/ DROPS OPHTHALMIC at 05:57

## 2017-07-27 RX ADMIN — SODIUM CHLORIDE SOLN NEBU 3% SCH ML: 3 NEBU SOLN at 08:27

## 2017-07-27 RX ADMIN — MORPHINE SULFATE PRN MG: 2 INJECTION, SOLUTION INTRAMUSCULAR; INTRAVENOUS at 09:06

## 2017-07-27 RX ADMIN — SODIUM CHLORIDE SOLN NEBU 3% SCH ML: 3 NEBU SOLN at 03:37

## 2017-07-27 RX ADMIN — VANCOMYCIN HYDROCHLORIDE SCH MLS/HR: 1 INJECTION, SOLUTION INTRAVENOUS at 17:47

## 2017-07-27 RX ADMIN — POLYVINYL ALCOHOL SCH DROP: 14 SOLUTION/ DROPS OPHTHALMIC at 22:00

## 2017-07-27 RX ADMIN — LEVOFLOXACIN SCH MLS/HR: 5 INJECTION, SOLUTION INTRAVENOUS at 00:16

## 2017-07-27 RX ADMIN — MAGNESIUM HYDROXIDE SCH ML: 400 SUSPENSION ORAL at 21:00

## 2017-07-27 RX ADMIN — WATER SCH ML: 1 IRRIGANT IRRIGATION at 08:57

## 2017-07-27 RX ADMIN — IPRATROPIUM BROMIDE AND ALBUTEROL SULFATE SCH AMPULE: .5; 3 SOLUTION RESPIRATORY (INHALATION) at 08:27

## 2017-07-27 RX ADMIN — POLYVINYL ALCOHOL SCH DROP: 14 SOLUTION/ DROPS OPHTHALMIC at 11:52

## 2017-07-27 RX ADMIN — MEGESTROL ACETATE SCH MG: 40 SUSPENSION ORAL at 14:00

## 2017-07-27 RX ADMIN — IPRATROPIUM BROMIDE AND ALBUTEROL SULFATE SCH AMPULE: .5; 3 SOLUTION RESPIRATORY (INHALATION) at 21:28

## 2017-07-27 RX ADMIN — HUMAN INSULIN SCH: 100 INJECTION, SOLUTION SUBCUTANEOUS at 00:00

## 2017-07-27 RX ADMIN — Medication SCH ML: at 08:57

## 2017-07-27 RX ADMIN — VANCOMYCIN HYDROCHLORIDE SCH MLS/HR: 1 INJECTION, SOLUTION INTRAVENOUS at 01:14

## 2017-07-27 RX ADMIN — ENOXAPARIN SODIUM SCH MG: 30 INJECTION SUBCUTANEOUS at 22:48

## 2017-07-27 RX ADMIN — OXYCODONE HYDROCHLORIDE AND ACETAMINOPHEN PRN TAB: 5; 325 TABLET ORAL at 14:23
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
## 2017-07-28 VITALS
TEMPERATURE: 96.9 F | HEART RATE: 92 BPM | DIASTOLIC BLOOD PRESSURE: 83 MMHG | RESPIRATION RATE: 18 BRPM | SYSTOLIC BLOOD PRESSURE: 128 MMHG | OXYGEN SATURATION: 99 %

## 2017-07-28 VITALS
DIASTOLIC BLOOD PRESSURE: 84 MMHG | SYSTOLIC BLOOD PRESSURE: 130 MMHG | HEART RATE: 93 BPM | TEMPERATURE: 96.8 F | RESPIRATION RATE: 18 BRPM | OXYGEN SATURATION: 98 %

## 2017-07-28 VITALS
DIASTOLIC BLOOD PRESSURE: 79 MMHG | TEMPERATURE: 98.3 F | OXYGEN SATURATION: 90 % | HEART RATE: 102 BPM | SYSTOLIC BLOOD PRESSURE: 123 MMHG | RESPIRATION RATE: 18 BRPM

## 2017-07-28 VITALS
OXYGEN SATURATION: 96 % | HEART RATE: 90 BPM | DIASTOLIC BLOOD PRESSURE: 75 MMHG | SYSTOLIC BLOOD PRESSURE: 117 MMHG | TEMPERATURE: 97 F | RESPIRATION RATE: 16 BRPM

## 2017-07-28 VITALS — OXYGEN SATURATION: 99 %

## 2017-07-28 RX ADMIN — POLYVINYL ALCOHOL SCH DROP: 14 SOLUTION/ DROPS OPHTHALMIC at 06:00

## 2017-07-28 RX ADMIN — POLYVINYL ALCOHOL SCH DROP: 14 SOLUTION/ DROPS OPHTHALMIC at 11:44

## 2017-07-28 RX ADMIN — ENOXAPARIN SODIUM SCH MG: 30 INJECTION SUBCUTANEOUS at 10:18

## 2017-07-28 RX ADMIN — SODIUM CHLORIDE SOLN NEBU 3% SCH ML: 3 NEBU SOLN at 03:26

## 2017-07-28 RX ADMIN — OXYCODONE HYDROCHLORIDE AND ACETAMINOPHEN PRN TAB: 5; 325 TABLET ORAL at 03:16

## 2017-07-28 RX ADMIN — IPRATROPIUM BROMIDE AND ALBUTEROL SULFATE SCH AMPULE: .5; 3 SOLUTION RESPIRATORY (INHALATION) at 03:26

## 2017-07-28 RX ADMIN — IPRATROPIUM BROMIDE AND ALBUTEROL SULFATE SCH AMPULE: .5; 3 SOLUTION RESPIRATORY (INHALATION) at 15:53

## 2017-07-28 RX ADMIN — LEVOFLOXACIN SCH MLS/HR: 5 INJECTION, SOLUTION INTRAVENOUS at 01:30

## 2017-07-28 RX ADMIN — SODIUM CHLORIDE SOLN NEBU 3% SCH ML: 3 NEBU SOLN at 08:35

## 2017-07-28 RX ADMIN — IPRATROPIUM BROMIDE AND ALBUTEROL SULFATE SCH AMPULE: .5; 3 SOLUTION RESPIRATORY (INHALATION) at 08:35

## 2017-07-28 RX ADMIN — SODIUM CHLORIDE SOLN NEBU 3% SCH ML: 3 NEBU SOLN at 15:54

## 2017-07-28 RX ADMIN — WATER SCH ML: 1 IRRIGANT IRRIGATION at 07:56

## 2017-07-28 RX ADMIN — OXYCODONE HYDROCHLORIDE AND ACETAMINOPHEN PRN TAB: 5; 325 TABLET ORAL at 06:28

## 2017-07-28 RX ADMIN — VANCOMYCIN HYDROCHLORIDE SCH MLS/HR: 1 INJECTION, SOLUTION INTRAVENOUS at 01:29

## 2017-07-28 RX ADMIN — MEGESTROL ACETATE SCH MG: 40 SUSPENSION ORAL at 07:56

## 2017-07-28 RX ADMIN — Medication SCH ML: at 07:55

## 2017-07-28 RX ADMIN — VANCOMYCIN HYDROCHLORIDE SCH MLS/HR: 1 INJECTION, SOLUTION INTRAVENOUS at 09:13

## 2017-07-28 RX ADMIN — PANTOPRAZOLE SODIUM SCH MG: 40 INJECTION, POWDER, FOR SOLUTION INTRAVENOUS at 06:24

## 2017-07-28 RX ADMIN — STANDARDIZED SENNA CONCENTRATE AND DOCUSATE SODIUM SCH TAB: 8.6; 5 TABLET, FILM COATED ORAL at 07:56
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
## 2017-11-10 ENCOUNTER — HOSPITAL ENCOUNTER (EMERGENCY)
Dept: HOSPITAL 17 - NEPE | Age: 37
Discharge: HOME | End: 2017-11-10
Payer: COMMERCIAL

## 2017-11-10 VITALS — BODY MASS INDEX: 20.02 KG/M2 | HEIGHT: 65 IN | WEIGHT: 120.15 LBS

## 2017-11-10 VITALS — RESPIRATION RATE: 12 BRPM | HEART RATE: 82 BPM | OXYGEN SATURATION: 99 %

## 2017-11-10 VITALS
RESPIRATION RATE: 13 BRPM | SYSTOLIC BLOOD PRESSURE: 120 MMHG | HEART RATE: 97 BPM | TEMPERATURE: 98.2 F | OXYGEN SATURATION: 100 % | DIASTOLIC BLOOD PRESSURE: 74 MMHG

## 2017-11-10 DIAGNOSIS — M79.605: ICD-10-CM

## 2017-11-10 DIAGNOSIS — Z79.899: ICD-10-CM

## 2017-11-10 DIAGNOSIS — F17.200: ICD-10-CM

## 2017-11-10 DIAGNOSIS — R60.9: Primary | ICD-10-CM

## 2017-11-10 DIAGNOSIS — M79.604: ICD-10-CM

## 2017-11-10 DIAGNOSIS — F32.9: ICD-10-CM

## 2017-11-10 DIAGNOSIS — M19.90: ICD-10-CM

## 2017-11-10 DIAGNOSIS — G89.29: ICD-10-CM

## 2017-11-10 DIAGNOSIS — F41.9: ICD-10-CM

## 2017-11-10 LAB
ALP SERPL-CCNC: 81 U/L (ref 45–117)
ALT SERPL-CCNC: 54 U/L (ref 10–53)
ANION GAP SERPL CALC-SCNC: 9 MEQ/L (ref 5–15)
AST SERPL-CCNC: 59 U/L (ref 15–37)
BASOPHILS # BLD AUTO: 0.1 TH/MM3 (ref 0–0.2)
BASOPHILS NFR BLD: 1.6 % (ref 0–2)
BILIRUB SERPL-MCNC: (no result) MG/DL (ref 0.2–1)
BUN SERPL-MCNC: 15 MG/DL (ref 7–18)
CHLORIDE SERPL-SCNC: 103 MEQ/L (ref 98–107)
COLOR UR: YELLOW
COMMENT (UR): (no result)
CULTURE IF INDICATED: (no result)
EOSINOPHIL # BLD: 0.4 TH/MM3 (ref 0–0.4)
EOSINOPHIL NFR BLD: 5.7 % (ref 0–4)
ERYTHROCYTE [DISTWIDTH] IN BLOOD BY AUTOMATED COUNT: 18.4 % (ref 11.6–17.2)
GFR SERPLBLD BASED ON 1.73 SQ M-ARVRAT: 115 ML/MIN (ref 89–?)
GLUCOSE UR STRIP-MCNC: (no result) MG/DL
HCO3 BLD-SCNC: 25.9 MEQ/L (ref 21–32)
HCT VFR BLD CALC: 34 % (ref 35–46)
HEMO FLAGS: (no result)
HGB UR QL STRIP: (no result)
HYALINE CASTS #/AREA URNS LPF: 9 /LPF
KETONES UR STRIP-MCNC: (no result) MG/DL
LYMPHOCYTES # BLD AUTO: 2.1 TH/MM3 (ref 1–4.8)
LYMPHOCYTES NFR BLD AUTO: 32 % (ref 9–44)
MCH RBC QN AUTO: 30.5 PG (ref 27–34)
MCHC RBC AUTO-ENTMCNC: 33.1 % (ref 32–36)
MCV RBC AUTO: 92.3 FL (ref 80–100)
MONOCYTES NFR BLD: 12.9 % (ref 0–8)
MUCOUS THREADS #/AREA URNS LPF: (no result) /LPF
NEUTROPHILS # BLD AUTO: 3.1 TH/MM3 (ref 1.8–7.7)
NEUTROPHILS NFR BLD AUTO: 47.8 % (ref 16–70)
NITRITE UR QL STRIP: (no result)
PLATELET # BLD: 540 TH/MM3 (ref 150–450)
POTASSIUM SERPL-SCNC: 3.6 MEQ/L (ref 3.5–5.1)
RBC # BLD AUTO: 3.68 MIL/MM3 (ref 4–5.3)
SODIUM SERPL-SCNC: 138 MEQ/L (ref 136–145)
SP GR UR STRIP: 1.02 (ref 1–1.03)
SQUAMOUS #/AREA URNS HPF: 1 /HPF (ref 0–5)
WBC # BLD AUTO: 6.5 TH/MM3 (ref 4–11)

## 2017-11-10 PROCEDURE — 83690 ASSAY OF LIPASE: CPT

## 2017-11-10 PROCEDURE — 96374 THER/PROPH/DIAG INJ IV PUSH: CPT

## 2017-11-10 PROCEDURE — 80053 COMPREHEN METABOLIC PANEL: CPT

## 2017-11-10 PROCEDURE — 74177 CT ABD & PELVIS W/CONTRAST: CPT

## 2017-11-10 PROCEDURE — 80307 DRUG TEST PRSMV CHEM ANLYZR: CPT

## 2017-11-10 PROCEDURE — 93970 EXTREMITY STUDY: CPT

## 2017-11-10 PROCEDURE — 85025 COMPLETE CBC W/AUTO DIFF WBC: CPT

## 2017-11-10 PROCEDURE — 81001 URINALYSIS AUTO W/SCOPE: CPT

## 2017-11-10 PROCEDURE — 99285 EMERGENCY DEPT VISIT HI MDM: CPT
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
## 2017-11-10 NOTE — RADRPT
EXAM DATE/TIME:  11/10/2017 11:31 

 

[facility] COMPARISON:     

CT ABDOMEN & PELVIS W CONTRAST, July 20, 2017, 22:25.

 

 

INDICATIONS :     

Abdominal distention. 

                      

 

IV CONTRAST:     

93 cc Omnipaque 350 (iohexol) IV 

 

 

ORAL CONTRAST:      

No oral contrast ingested.

                      

 

RADIATION DOSE:     

4.89 CTDIvol (mGy) 

 

 

MEDICAL HISTORY :     

None  

 

SURGICAL HISTORY :      

Splenectomy. 

 

ENCOUNTER:      

Initial

 

ACUITY:      

2 weeks

 

PAIN SCALE:      

4/10

 

LOCATION:         

abdomen

 

TECHNIQUE:     

Volumetric scanning of the abdomen and pelvis was performed.  Using automated exposure control and ad
justment of the mA and/or kV according to patient size, radiation dose was kept as low as reasonably 
achievable to obtain optimal diagnostic quality images.  DICOM format image data is available electro
nically for review and comparison.  

 

FINDINGS:     

 

LOWER LUNGS:     

The visualized lower lungs are clear.

 

LIVER:     

Homogeneous density without lesion.  There is no dilation of the biliary tree.  No calcified gallston
es.

 

SPLEEN:     

Status post surgical removal.

 

PANCREAS:     

Within normal limits.

 

KIDNEYS:     

Normal in size and shape.  There is no mass, stone or hydronephrosis.

 

ADRENAL GLANDS:     

Within normal limits.

 

VASCULAR:     

There is no aortic aneurysm.

 

BOWEL/MESENTERY:     

The stomach, small bowel, and colon demonstrate no acute abnormality.  There is no free intraperitone
al air or fluid. No inflammatory changes. There is stool throughout the colon. The appendix is unrema
rkable.

 

ABDOMINAL WALL:     

Within normal limits.

 

RETROPERITONEUM:     

There is no lymphadenopathy. 

 

BLADDER:     

No wall thickening or mass. 

 

REPRODUCTIVE:     

Within normal limits.

 

INGUINAL:     

There is no lymphadenopathy or hernia. 

 

MUSCULOSKELETAL:     

Within normal limits for patient age. 

 

CONCLUSION:     

1. Status post splenectomy.

2. Otherwise, unremarkable examination for patient's age.

 

 

 

 Andrew Pires MD on November 10, 2017 at 12:07           

Board Certified Radiologist.

 This report was verified electronically.
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
## 2018-05-06 ENCOUNTER — HOSPITAL ENCOUNTER (EMERGENCY)
Dept: HOSPITAL 17 - NEPD | Age: 38
Discharge: HOME | End: 2018-05-06
Payer: MEDICAID

## 2018-05-06 VITALS
OXYGEN SATURATION: 99 % | TEMPERATURE: 98.2 F | DIASTOLIC BLOOD PRESSURE: 92 MMHG | SYSTOLIC BLOOD PRESSURE: 136 MMHG | HEART RATE: 118 BPM | RESPIRATION RATE: 16 BRPM

## 2018-05-06 VITALS
SYSTOLIC BLOOD PRESSURE: 132 MMHG | DIASTOLIC BLOOD PRESSURE: 82 MMHG | OXYGEN SATURATION: 99 % | HEART RATE: 87 BPM | RESPIRATION RATE: 16 BRPM

## 2018-05-06 VITALS — WEIGHT: 139.99 LBS | HEIGHT: 65 IN | BODY MASS INDEX: 23.32 KG/M2

## 2018-05-06 DIAGNOSIS — F17.200: ICD-10-CM

## 2018-05-06 DIAGNOSIS — I10: ICD-10-CM

## 2018-05-06 DIAGNOSIS — Z79.899: ICD-10-CM

## 2018-05-06 DIAGNOSIS — F41.9: Primary | ICD-10-CM

## 2018-05-06 DIAGNOSIS — F32.9: ICD-10-CM

## 2018-05-06 LAB
ALBUMIN SERPL-MCNC: 3.9 GM/DL (ref 3.4–5)
ALP SERPL-CCNC: 62 U/L (ref 45–117)
ALT SERPL-CCNC: 20 U/L (ref 10–53)
AST SERPL-CCNC: 28 U/L (ref 15–37)
BACTERIA #/AREA URNS HPF: (no result) /HPF
BASOPHILS # BLD AUTO: 0.1 TH/MM3 (ref 0–0.2)
BASOPHILS NFR BLD: 0.9 % (ref 0–2)
BILIRUB SERPL-MCNC: 0.3 MG/DL (ref 0.2–1)
BUN SERPL-MCNC: 14 MG/DL (ref 7–18)
CALCIUM SERPL-MCNC: 8.6 MG/DL (ref 8.5–10.1)
CHLORIDE SERPL-SCNC: 108 MEQ/L (ref 98–107)
COLOR UR: (no result)
CREAT SERPL-MCNC: 0.66 MG/DL (ref 0.5–1)
EOSINOPHIL # BLD: 0.2 TH/MM3 (ref 0–0.4)
EOSINOPHIL NFR BLD: 1.7 % (ref 0–4)
ERYTHROCYTE [DISTWIDTH] IN BLOOD BY AUTOMATED COUNT: 17.8 % (ref 11.6–17.2)
GFR SERPLBLD BASED ON 1.73 SQ M-ARVRAT: 101 ML/MIN (ref 89–?)
GLUCOSE SERPL-MCNC: 102 MG/DL (ref 74–106)
GLUCOSE UR STRIP-MCNC: (no result) MG/DL
HCO3 BLD-SCNC: 23.9 MEQ/L (ref 21–32)
HCT VFR BLD CALC: 38.8 % (ref 35–46)
HGB BLD-MCNC: 13.5 GM/DL (ref 11.6–15.3)
HGB UR QL STRIP: (no result)
KETONES UR STRIP-MCNC: (no result) MG/DL
LYMPHOCYTES # BLD AUTO: 2 TH/MM3 (ref 1–4.8)
LYMPHOCYTES NFR BLD AUTO: 19.6 % (ref 9–44)
MCH RBC QN AUTO: 31.9 PG (ref 27–34)
MCHC RBC AUTO-ENTMCNC: 34.7 % (ref 32–36)
MCV RBC AUTO: 91.9 FL (ref 80–100)
MONOCYTE #: 0.7 TH/MM3 (ref 0–0.9)
MONOCYTES NFR BLD: 6.8 % (ref 0–8)
MUCOUS THREADS #/AREA URNS LPF: (no result) /LPF
NEUTROPHILS # BLD AUTO: 7.4 TH/MM3 (ref 1.8–7.7)
NEUTROPHILS NFR BLD AUTO: 71 % (ref 16–70)
NITRITE UR QL STRIP: (no result)
PLATELET # BLD: 508 TH/MM3 (ref 150–450)
PMV BLD AUTO: 8.2 FL (ref 7–11)
PROT SERPL-MCNC: 7.4 GM/DL (ref 6.4–8.2)
RBC # BLD AUTO: 4.23 MIL/MM3 (ref 4–5.3)
SODIUM SERPL-SCNC: 142 MEQ/L (ref 136–145)
SP GR UR STRIP: 1.02 (ref 1–1.03)
SQUAMOUS #/AREA URNS HPF: 19 /HPF (ref 0–5)
URINE LEUKOCYTE ESTERASE: (no result)
WBC # BLD AUTO: 10.5 TH/MM3 (ref 4–11)

## 2018-05-06 PROCEDURE — 84703 CHORIONIC GONADOTROPIN ASSAY: CPT

## 2018-05-06 PROCEDURE — 85025 COMPLETE CBC W/AUTO DIFF WBC: CPT

## 2018-05-06 PROCEDURE — 81001 URINALYSIS AUTO W/SCOPE: CPT

## 2018-05-06 PROCEDURE — 99283 EMERGENCY DEPT VISIT LOW MDM: CPT

## 2018-05-06 PROCEDURE — 80053 COMPREHEN METABOLIC PANEL: CPT

## 2018-05-06 PROCEDURE — 80307 DRUG TEST PRSMV CHEM ANLYZR: CPT

## 2018-05-06 PROCEDURE — 84443 ASSAY THYROID STIM HORMONE: CPT

## 2018-05-06 NOTE — PD
HPI


Chief Complaint:  Psychiatric Symptoms


Time Seen by Provider:  14:13


Travel History


International Travel<30 days:  No


Contact w/Intl Traveler<30days:  No


Traveled to known affect area:  No





History of Present Illness


HPI


37-year-old female came to the emergency room with history of depression, 

anxiety for past 2 weeks.  She says she is about to lose custody of her fourth 

child and has a lot of other social issues going on.  She used to be addicted 

to Suboxone but that was a few months ago.  Currently she says she has not used 

Suboxone or any other opiates.  She has not been sleeping well and 2 days ago 

she got some Xanax off the street and took 3 of them but did not help.  Her 

sister brought her here.  She has history of depression but has not been on 

medications for many months.  She has a history of hypertension and has not 

been on medication for that either.  Patient denies any suicidal ideations 

currently.  But she says she needs help because she has been unable to function 

because of all these.  Patient was tachycardic in triage.  She appeared very 

anxious.





[facility]


Past Medical History


*** Narrative Medical


List of her past medical, surgical, social and family history is reviewed from 

the nursing note.


Arthritis:  Yes


Anxiety:  Yes


Depression:  Yes


Cancer:  No


Cardiovascular Problems:  No


Cerebrovascular Accident:  No


Diminished Hearing:  No


Endocrine:  No


Genitourinary:  Yes


Headaches:  No


Hypertension:  Yes


Immune Disorder:  No


Kidney Stones:  No


Musculoskeletal:  Yes


Neurologic:  Yes (current admission, Subarachnoid hem)


Psychiatric:  Yes


Reproductive:  No


Respiratory:  No


Immunizations Current:  Yes


Migraines:  No


Renal Failure:  No


Seizures:  No


Pregnant?:  Not Pregnant


LMP:  DEPO


:  6


Para:  5


Miscarriage:  1





Past Surgical History


Abdominal Surgery:  Yes ()


Cardiac Surgery:  No


 Section:  Yes (2)


Ear Surgery:  No


Endocrine Surgery:  No


Eye Surgery:  Yes (Lasic)


Genitourinary Surgery:  No


Gynecologic Surgery:  Yes ()


Neurologic Surgery:  No


Oral Surgery:  No


Thoracic Surgery:  No


Other Surgery:  Yes (SPLENECTOMY )





Social History


Alcohol Use:  No


Tobacco Use:  Yes (1 PPD)


Substance Use:  No (HX OF XANAX ABUSE AND OPIATE ABUSE)





Allergies-Medications


(Allergen,Severity, Reaction):  


Coded Allergies:  


     amoxicillin (Verified  Allergy, Severe, HIVES, VOMITING, 18)


     penicillin G (Verified  Allergy, Severe, HIVES, VOMITING, 18)


Comments


List of her allergies reviewed from the nursing note


Reported Meds & Prescriptions





Reported Meds & Active Scripts


Active


Reported


Lisinopril 10 Mg Tab 10 Mg PO DAILY


Hydrochlorothiazide 12.5 Mg Tab 12.5 Mg PO DAILY





Narrative Medication


Stop her home medications reviewed from the nursing note.





Review of Systems


Except as stated in HPI:  all other systems reviewed are Neg


Psychiatric:  Positive: Anxiety, Depression





Physical Exam


Narrative


GENERAL: Awake, alert, anxious, no obvious distress


SKIN: Focused skin assessment warm/dry.


HEAD: Atraumatic. Normocephalic. 


EYES: Pupils equal and round. No scleral icterus. No injection or drainage. 


ENT: No nasal bleeding or discharge.  Mucous membranes pink and moist.


NECK: Trachea midline. No JVD. 


CARDIOVASCULAR: Regular rate and rhythm.  No murmur appreciated.


RESPIRATORY: No accessory muscle use. Clear to auscultation. Breath sounds 

equal bilaterally. 


GASTROINTESTINAL: Abdomen soft, non-tender, nondistended. Hepatic and splenic 

margins not palpable. 


MUSCULOSKELETAL: No obvious deformities. No clubbing.  No cyanosis.  No edema. 


NEUROLOGICAL: Awake and alert. No obvious cranial nerve deficits.  Motor 

grossly within normal limits. Normal speech.


PSYCHIATRIC: Appropriate mood and affect; insight and judgment normal.





Data


Data


Last Documented VS





Orders





 Orders


Complete Blood Count With Diff (18 14:33)


Comprehensive Metabolic Panel (18 14:33)


Thyroid Stimulating Hormone (18 14:33)


Ed Urine Pregnancytest Poc (18 14:33)


Psych Screen (18 14:33)


Drug Screen, Random Urine (18 14:33)


Alcohol (Ethanol) (18 14:33)


Lorazepam (Ativan) (18 16:15)


Diet Regular Basic (18 Dinner)


Urinalysis - C+S If Indicated (18 16:37)


Ed Discharge Order (18 17:49)





Labs





Laboratory Tests








Test


  18


14:45


 


White Blood Count 10.5 TH/MM3 


 


Red Blood Count 4.23 MIL/MM3 


 


Hemoglobin 13.5 GM/DL 


 


Hematocrit 38.8 % 


 


Mean Corpuscular Volume 91.9 FL 


 


Mean Corpuscular Hemoglobin 31.9 PG 


 


Mean Corpuscular Hemoglobin


Concent 34.7 % 


 


 


Red Cell Distribution Width 17.8 % 


 


Platelet Count 508 TH/MM3 


 


Mean Platelet Volume 8.2 FL 


 


Neutrophils (%) (Auto) 71.0 % 


 


Lymphocytes (%) (Auto) 19.6 % 


 


Monocytes (%) (Auto) 6.8 % 


 


Eosinophils (%) (Auto) 1.7 % 


 


Basophils (%) (Auto) 0.9 % 


 


Neutrophils # (Auto) 7.4 TH/MM3 


 


Lymphocytes # (Auto) 2.0 TH/MM3 


 


Monocytes # (Auto) 0.7 TH/MM3 


 


Eosinophils # (Auto) 0.2 TH/MM3 


 


Basophils # (Auto) 0.1 TH/MM3 


 


CBC Comment DIFF FINAL 


 


Differential Comment  


 


Urine Color LIGHT-YELLOW 


 


Urine Turbidity HAZY 


 


Urine pH 6.0 


 


Urine Specific Gravity 1.016 


 


Urine Protein NEG mg/dL 


 


Urine Glucose (UA) NEG mg/dL 


 


Urine Ketones NEG mg/dL 


 


Urine Occult Blood NEG 


 


Urine Nitrite NEG 


 


Urine Bilirubin NEG 


 


Urine Urobilinogen


  LESS THAN 2.0


MG/DL


 


Urine Leukocyte Esterase LARGE 


 


Urine RBC 2 /hpf 


 


Urine WBC 5 /hpf 


 


Urine Squamous Epithelial


Cells 19 /hpf 


 


 


Urine Bacteria FEW /hpf 


 


Urine Mucus FEW /lpf 


 


Microscopic Urinalysis Comment


  CULT NOT


INDICATED


 


Blood Urea Nitrogen 14 MG/DL 


 


Creatinine 0.66 MG/DL 


 


Random Glucose 102 MG/DL 


 


Total Protein 7.4 GM/DL 


 


Albumin 3.9 GM/DL 


 


Calcium Level 8.6 MG/DL 


 


Alkaline Phosphatase 62 U/L 


 


Aspartate Amino Transf


(AST/SGOT) 28 U/L 


 


 


Alanine Aminotransferase


(ALT/SGPT) 20 U/L 


 


 


Total Bilirubin 0.3 MG/DL 


 


Sodium Level 142 MEQ/L 


 


Potassium Level 4.2 MEQ/L 


 


Chloride Level 108 MEQ/L 


 


Carbon Dioxide Level 23.9 MEQ/L 


 


Anion Gap 10 MEQ/L 


 


Estimat Glomerular Filtration


Rate 101 ML/MIN 


 


 


Thyroid Stimulating Hormone


3rd Gen 1.100 uIU/ML 


 


 


Urine Opiates Screen NEG 


 


Urine Barbiturates Screen NEG 


 


Urine Amphetamines Screen NEG 


 


Urine Benzodiazepines Screen POS 


 


Urine Cocaine Screen NEG 


 


Urine Cannabinoids Screen NEG 


 


Ethyl Alcohol Level


  LESS THAN 3


MG/DL











[facility]


Medical Decision Making


Medical Screen Exam Complete:  Yes


Emergency Medical Condition:  Yes


Medical Record Reviewed:  Yes


Differential Diagnosis


Depression, anxiety,


Narrative Course


4:12 PM urine drug screen is positive for benzodiazepine.  Patient requested 

something to calm her down and I have ordered p.o. Ativan.  She is medically 

cleared and would require psych screen.





Procedures


EKG Prior to Arrival:  Bhupinder Walker MD May 6, 2018 16:12

## 2018-05-06 NOTE — PD
Data


Data


Last Documented VS





Vital Signs








  Date Time  Temp Pulse Resp B/P (MAP) Pulse Ox O2 Delivery O2 Flow Rate FiO2


 


5/6/18 13:32 98.2 118 16 136/92 (107) 99   








Orders





 Orders


Complete Blood Count With Diff (5/6/18 14:33)


Comprehensive Metabolic Panel (5/6/18 14:33)


Thyroid Stimulating Hormone (5/6/18 14:33)


Ed Urine Pregnancytest Poc (5/6/18 14:33)


Psych Screen (5/6/18 14:33)


Drug Screen, Random Urine (5/6/18 14:33)


Alcohol (Ethanol) (5/6/18 14:33)


Lorazepam (Ativan) (5/6/18 16:15)


Diet Regular Basic (5/6/18 Dinner)


Urinalysis - C+S If Indicated (5/6/18 16:37)





Labs





Laboratory Tests








Test


  5/6/18


14:45


 


White Blood Count 10.5 TH/MM3 


 


Red Blood Count 4.23 MIL/MM3 


 


Hemoglobin 13.5 GM/DL 


 


Hematocrit 38.8 % 


 


Mean Corpuscular Volume 91.9 FL 


 


Mean Corpuscular Hemoglobin 31.9 PG 


 


Mean Corpuscular Hemoglobin


Concent 34.7 % 


 


 


Red Cell Distribution Width 17.8 % 


 


Platelet Count 508 TH/MM3 


 


Mean Platelet Volume 8.2 FL 


 


Neutrophils (%) (Auto) 71.0 % 


 


Lymphocytes (%) (Auto) 19.6 % 


 


Monocytes (%) (Auto) 6.8 % 


 


Eosinophils (%) (Auto) 1.7 % 


 


Basophils (%) (Auto) 0.9 % 


 


Neutrophils # (Auto) 7.4 TH/MM3 


 


Lymphocytes # (Auto) 2.0 TH/MM3 


 


Monocytes # (Auto) 0.7 TH/MM3 


 


Eosinophils # (Auto) 0.2 TH/MM3 


 


Basophils # (Auto) 0.1 TH/MM3 


 


CBC Comment DIFF FINAL 


 


Differential Comment  


 


Urine Color LIGHT-YELLOW 


 


Urine Turbidity HAZY 


 


Urine pH 6.0 


 


Urine Specific Gravity 1.016 


 


Urine Protein NEG mg/dL 


 


Urine Glucose (UA) NEG mg/dL 


 


Urine Ketones NEG mg/dL 


 


Urine Occult Blood NEG 


 


Urine Nitrite NEG 


 


Urine Bilirubin NEG 


 


Urine Urobilinogen


  LESS THAN 2.0


MG/DL


 


Urine Leukocyte Esterase LARGE 


 


Urine RBC 2 /hpf 


 


Urine WBC 5 /hpf 


 


Urine Squamous Epithelial


Cells 19 /hpf 


 


 


Urine Bacteria FEW /hpf 


 


Urine Mucus FEW /lpf 


 


Microscopic Urinalysis Comment


  CULT NOT


INDICATED


 


Blood Urea Nitrogen 14 MG/DL 


 


Creatinine 0.66 MG/DL 


 


Random Glucose 102 MG/DL 


 


Total Protein 7.4 GM/DL 


 


Albumin 3.9 GM/DL 


 


Calcium Level 8.6 MG/DL 


 


Alkaline Phosphatase 62 U/L 


 


Aspartate Amino Transf


(AST/SGOT) 28 U/L 


 


 


Alanine Aminotransferase


(ALT/SGPT) 20 U/L 


 


 


Total Bilirubin 0.3 MG/DL 


 


Sodium Level 142 MEQ/L 


 


Potassium Level 4.2 MEQ/L 


 


Chloride Level 108 MEQ/L 


 


Carbon Dioxide Level 23.9 MEQ/L 


 


Anion Gap 10 MEQ/L 


 


Estimat Glomerular Filtration


Rate 101 ML/MIN 


 


 


Thyroid Stimulating Hormone


3rd Gen 1.100 uIU/ML 


 


 


Urine Opiates Screen NEG 


 


Urine Barbiturates Screen NEG 


 


Urine Amphetamines Screen NEG 


 


Urine Benzodiazepines Screen POS 


 


Urine Cocaine Screen NEG 


 


Urine Cannabinoids Screen NEG 


 


Ethyl Alcohol Level


  LESS THAN 3


MG/DL











MDM


Medical Record Reviewed:  Yes


Supervised Visit with CITLALLI:  No


Narrative Course


Please see previous providers notes for complete history of present illness.  

Briefly this is a 37-year-old female who has been experiencing depression and 

anxiety.  She is denying any suicidal homicidal thoughts.  Her thought process 

is clear.  Her insight and judgment are intact.  She has been seen by the 

psychiatry team and she does not meet inpatient psychiatric criteria.  She does 

not meet Baker act criteria.  She feels comfortable going home.  Psychiatry did 

give her a resource packet for outpatient follow-up purposes.  She is stable 

for discharge.


Diagnosis





 Primary Impression:  


 Anxiety


 Additional Impression:  


 Depression


Referrals:  


Psychiatrist





***Additional Instruction:  


Follow-up with psychiatry.  Return for any emergent medical conditions.


***Med/Other Pt SpecificInfo:  No Change to Meds


Disposition:  01 DISCHARGE HOME


Condition:  Stable











Kennedy Heller May 6, 2018 17:51

## 2018-05-14 ENCOUNTER — HOSPITAL ENCOUNTER (INPATIENT)
Dept: HOSPITAL 17 - NEPD | Age: 38
LOS: 4 days | Discharge: HOME | DRG: 885 | End: 2018-05-18
Attending: PSYCHIATRY & NEUROLOGY | Admitting: PSYCHIATRY & NEUROLOGY
Payer: MEDICAID

## 2018-05-14 VITALS
RESPIRATION RATE: 15 BRPM | DIASTOLIC BLOOD PRESSURE: 93 MMHG | TEMPERATURE: 98 F | SYSTOLIC BLOOD PRESSURE: 132 MMHG | HEART RATE: 78 BPM | OXYGEN SATURATION: 99 %

## 2018-05-14 VITALS — HEIGHT: 65 IN | WEIGHT: 128.53 LBS | BODY MASS INDEX: 21.41 KG/M2

## 2018-05-14 VITALS
HEART RATE: 109 BPM | RESPIRATION RATE: 16 BRPM | OXYGEN SATURATION: 100 % | TEMPERATURE: 98.4 F | DIASTOLIC BLOOD PRESSURE: 111 MMHG | SYSTOLIC BLOOD PRESSURE: 158 MMHG

## 2018-05-14 VITALS
HEART RATE: 80 BPM | OXYGEN SATURATION: 100 % | RESPIRATION RATE: 22 BRPM | DIASTOLIC BLOOD PRESSURE: 83 MMHG | SYSTOLIC BLOOD PRESSURE: 143 MMHG

## 2018-05-14 VITALS
HEART RATE: 70 BPM | RESPIRATION RATE: 16 BRPM | DIASTOLIC BLOOD PRESSURE: 77 MMHG | OXYGEN SATURATION: 98 % | SYSTOLIC BLOOD PRESSURE: 124 MMHG

## 2018-05-14 DIAGNOSIS — F41.9: ICD-10-CM

## 2018-05-14 DIAGNOSIS — F31.30: Primary | ICD-10-CM

## 2018-05-14 DIAGNOSIS — I10: ICD-10-CM

## 2018-05-14 DIAGNOSIS — Z88.0: ICD-10-CM

## 2018-05-14 DIAGNOSIS — N39.0: ICD-10-CM

## 2018-05-14 DIAGNOSIS — Z72.0: ICD-10-CM

## 2018-05-14 DIAGNOSIS — Z88.1: ICD-10-CM

## 2018-05-14 LAB
ACANTHOCYTES BLD QL SMEAR: (no result)
ALBUMIN SERPL-MCNC: 4.3 GM/DL (ref 3.4–5)
ALP SERPL-CCNC: 65 U/L (ref 45–117)
ALT SERPL-CCNC: 20 U/L (ref 10–53)
APAP SERPL-MCNC: (no result) MCG/ML (ref 10–30)
AST SERPL-CCNC: 13 U/L (ref 15–37)
BACTERIA #/AREA URNS HPF: (no result) /HPF
BASOPHILS # BLD AUTO: 0.1 TH/MM3 (ref 0–0.2)
BASOPHILS NFR BLD: 0.9 % (ref 0–2)
BILIRUB SERPL-MCNC: 0.3 MG/DL (ref 0.2–1)
BUN SERPL-MCNC: 13 MG/DL (ref 7–18)
CALCIUM SERPL-MCNC: 9 MG/DL (ref 8.5–10.1)
CHLORIDE SERPL-SCNC: 109 MEQ/L (ref 98–107)
COLOR UR: YELLOW
CREAT SERPL-MCNC: 0.67 MG/DL (ref 0.5–1)
EOSINOPHIL # BLD: 0.1 TH/MM3 (ref 0–0.4)
EOSINOPHIL NFR BLD: 1 % (ref 0–4)
ERYTHROCYTE [DISTWIDTH] IN BLOOD BY AUTOMATED COUNT: 18.5 % (ref 11.6–17.2)
GFR SERPLBLD BASED ON 1.73 SQ M-ARVRAT: 100 ML/MIN (ref 89–?)
GLUCOSE SERPL-MCNC: 89 MG/DL (ref 74–106)
GLUCOSE UR STRIP-MCNC: (no result) MG/DL
HCO3 BLD-SCNC: 23.6 MEQ/L (ref 21–32)
HCT VFR BLD CALC: 39 % (ref 35–46)
HGB BLD-MCNC: 14 GM/DL (ref 11.6–15.3)
HGB UR QL STRIP: (no result)
HYALINE CASTS #/AREA URNS LPF: 3 /LPF
KETONES UR STRIP-MCNC: (no result) MG/DL
LYMPHOCYTES # BLD AUTO: 2 TH/MM3 (ref 1–4.8)
LYMPHOCYTES NFR BLD AUTO: 24.5 % (ref 9–44)
MCH RBC QN AUTO: 33.7 PG (ref 27–34)
MCHC RBC AUTO-ENTMCNC: 36 % (ref 32–36)
MCV RBC AUTO: 93.7 FL (ref 80–100)
MONOCYTE #: 0.5 TH/MM3 (ref 0–0.9)
MONOCYTES NFR BLD: 5.6 % (ref 0–8)
MUCOUS THREADS #/AREA URNS LPF: (no result) /LPF
NEUTROPHILS # BLD AUTO: 5.6 TH/MM3 (ref 1.8–7.7)
NEUTROPHILS NFR BLD AUTO: 68 % (ref 16–70)
NITRITE UR QL STRIP: (no result)
PLATELET # BLD: 573 TH/MM3 (ref 150–450)
PMV BLD AUTO: 7.4 FL (ref 7–11)
PROT SERPL-MCNC: 7.8 GM/DL (ref 6.4–8.2)
RBC # BLD AUTO: 4.16 MIL/MM3 (ref 4–5.3)
SODIUM SERPL-SCNC: 141 MEQ/L (ref 136–145)
SP GR UR STRIP: 1.01 (ref 1–1.03)
SQUAMOUS #/AREA URNS HPF: 27 /HPF (ref 0–5)
URINE LEUKOCYTE ESTERASE: (no result)
WBC # BLD AUTO: 8.2 TH/MM3 (ref 4–11)

## 2018-05-14 PROCEDURE — 80053 COMPREHEN METABOLIC PANEL: CPT

## 2018-05-14 PROCEDURE — 81001 URINALYSIS AUTO W/SCOPE: CPT

## 2018-05-14 PROCEDURE — 85025 COMPLETE CBC W/AUTO DIFF WBC: CPT

## 2018-05-14 PROCEDURE — 80061 LIPID PANEL: CPT

## 2018-05-14 PROCEDURE — 87086 URINE CULTURE/COLONY COUNT: CPT

## 2018-05-14 PROCEDURE — 83036 HEMOGLOBIN GLYCOSYLATED A1C: CPT

## 2018-05-14 PROCEDURE — 84703 CHORIONIC GONADOTROPIN ASSAY: CPT

## 2018-05-14 PROCEDURE — 80307 DRUG TEST PRSMV CHEM ANLYZR: CPT

## 2018-05-14 PROCEDURE — 80048 BASIC METABOLIC PNL TOTAL CA: CPT

## 2018-05-14 PROCEDURE — 84443 ASSAY THYROID STIM HORMONE: CPT

## 2018-05-14 RX ADMIN — SULFAMETHOXAZOLE AND TRIMETHOPRIM SCH TAB: 800; 160 TABLET ORAL at 12:54

## 2018-05-14 RX ADMIN — SULFAMETHOXAZOLE AND TRIMETHOPRIM SCH TAB: 800; 160 TABLET ORAL at 22:15
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
## 2018-05-14 NOTE — PD
HPI


Chief Complaint:  Psychiatric Symptoms


Time Seen by Provider:  09:35


Travel History


International Travel<30 days:  No


Contact w/Intl Traveler<30days:  No


Traveled to known affect area:  No





History of Present Illness


HPI


37-year-old female presents to the emergency department voluntarily requesting 

psychological evaluation for increased depression since she has been off her 

medications 1 month ago.  She has history of depression and bipolar disorder.  

Takes Effexor, Zyprexa, and Xanax and has had no medications 1 month.  Denies 

suicidal or homicidal ideations.  Denies auditory or visual hallucinations.  

Denies illicit drug use.  Reports occasional alcohol use.  Symptoms have been 

aggravated by being off her meds and stress.  Says she cannot function.  No 

known relieving factors.  Onset 1 month ago.  Duration chronic.  Symptoms are 

moderate to severe in severity.  Her psychiatrist was Dr. Petit, but he no 

longer takes her insurance.  No primary care provider.  Allergies to 

penicillins.  History of hypertension.  Has no other medical complaints.  No 

modifying factors or associated signs and symptoms.





PFSH


Past Medical History


Arthritis:  Yes


Anxiety:  Yes


Depression:  Yes


Cancer:  No


Cardiovascular Problems:  No


Cerebrovascular Accident:  No


Diminished Hearing:  No


Endocrine:  No


Genitourinary:  Yes


Headaches:  No


Hypertension:  Yes


Immune Disorder:  No


Kidney Stones:  No


Musculoskeletal:  Yes


Neurologic:  Yes


Psychiatric:  Yes


Reproductive:  No


Respiratory:  No


Immunizations Current:  Yes


Migraines:  No


Renal Failure:  No


Seizures:  No


Tetanus Vaccination:  < 5 Years


Pregnant?:  Not Pregnant


:  6


Para:  5


Miscarriage:  1





Past Surgical History


Abdominal Surgery:  Yes ()


Cardiac Surgery:  No


 Section:  Yes (2)


Ear Surgery:  No


Endocrine Surgery:  No


Eye Surgery:  Yes (Lasic)


Genitourinary Surgery:  No


Gynecologic Surgery:  Yes ()


Neurologic Surgery:  No


Oral Surgery:  No


Thoracic Surgery:  No


Other Surgery:  Yes (SPLENECTOMY )





Social History


Alcohol Use:  No


Tobacco Use:  Yes (1 PPD)


Substance Use:  Yes





Allergies-Medications


(Allergen,Severity, Reaction):  


Coded Allergies:  


     amoxicillin (Verified  Allergy, Severe, HIVES, VOMITING, 18)


     penicillin G (Verified  Allergy, Severe, HIVES, VOMITING, 18)


Reported Meds & Prescriptions





Reported Meds & Active Scripts


Active


Reported


Lisinopril 10 Mg Tab 10 Mg PO DAILY


Hydrochlorothiazide 12.5 Mg Tab 12.5 Mg PO DAILY








Review of Systems


Except as stated in HPI:  all other systems reviewed are Neg





Physical Exam


Narrative


GENERAL: Well-nourished, well-developed  female patient, in no acute 

distress


SKIN: Warm and dry.


HEAD: Atraumatic. Normocephalic. 


EYES: Pupils equal and round.


ENT: Mucosa pink and moist.


NECK: Supple.  Trachea midline.  


CARDIOVASCULAR: Regular rate and rhythm.  No murmur appreciated.  


RESPIRATORY: No accessory muscle use. Clear to auscultation. Breath sounds 

equal bilaterally. 


GASTROINTESTINAL: Abdomen soft, non-tender, nondistended. Hepatic and splenic 

margins not palpable.  Bowel sounds are active 4 quadrants.  


MUSCULOSKELETAL: No obvious deformities. No clubbing.  No cyanosis.  No edema. 


BACK: No CVA tenderness.


NEUROLOGICAL: Awake and alert.  Oriented 3.  No obvious cranial nerve 

deficits.  Motor grossly within normal limits. Normal speech.  Moves all 

extremities.  5/5 strength to all extremities.


PSYCHIATRIC: No delusional thought processes. No hallucinations.





Data


Data


Last Documented VS





Vital Signs








  Date Time  Temp Pulse Resp B/P (MAP) Pulse Ox O2 Delivery O2 Flow Rate FiO2


 


18 09:27 98.4 109 16 158/111 (127) 100   








Orders





 Orders


Complete Blood Count With Diff (18 09:45)


Comprehensive Metabolic Panel (18 09:45)


Thyroid Stimulating Hormone (18 09:45)


Urinalysis - C+S If Indicated (18 09:45)


Ed Urine Pregnancytest Poc (18 09:45)


Psych Screen (18 09:45)


Drug Screen, Random Urine (18 09:45)


Alcohol (Ethanol) (18 09:45)


Salicylates (Aspirin) (18 09:45)


Tylenol (Acetaminophen) (18 09:45)


Urine Culture (18 10:00)


Diet Regular Basic (18 Lunch)





Labs





Laboratory Tests








Test


  18


10:00


 


White Blood Count 8.2 TH/MM3 


 


Red Blood Count 4.16 MIL/MM3 


 


Hemoglobin 14.0 GM/DL 


 


Hematocrit 39.0 % 


 


Mean Corpuscular Volume 93.7 FL 


 


Mean Corpuscular Hemoglobin 33.7 PG 


 


Mean Corpuscular Hemoglobin


Concent 36.0 % 


 


 


Red Cell Distribution Width 18.5 % 


 


Platelet Count 573 TH/MM3 


 


Mean Platelet Volume 7.4 FL 


 


Neutrophils (%) (Auto) 68.0 % 


 


Lymphocytes (%) (Auto) 24.5 % 


 


Monocytes (%) (Auto) 5.6 % 


 


Eosinophils (%) (Auto) 1.0 % 


 


Basophils (%) (Auto) 0.9 % 


 


Neutrophils # (Auto) 5.6 TH/MM3 


 


Lymphocytes # (Auto) 2.0 TH/MM3 


 


Monocytes # (Auto) 0.5 TH/MM3 


 


Eosinophils # (Auto) 0.1 TH/MM3 


 


Basophils # (Auto) 0.1 TH/MM3 


 


CBC Comment AUTO DIFF 


 


Differential Comment


  AUTO DIFF


CONFIRMED


 


Platelet Estimate HIGH 


 


Platelet Morphology Comment NORMAL 


 


Acanthocytes OCC 


 


Urine Color YELLOW 


 


Urine Turbidity HAZY 


 


Urine pH 7.0 


 


Urine Specific Gravity 1.013 


 


Urine Protein NEG mg/dL 


 


Urine Glucose (UA) NEG mg/dL 


 


Urine Ketones NEG mg/dL 


 


Urine Occult Blood MOD 


 


Urine Nitrite NEG 


 


Urine Bilirubin NEG 


 


Urine Urobilinogen


  LESS THAN 2.0


MG/DL


 


Urine Leukocyte Esterase LARGE 


 


Urine RBC 4 /hpf 


 


Urine WBC 49 /hpf 


 


Urine Squamous Epithelial


Cells 27 /hpf 


 


 


Urine Bacteria FEW /hpf 


 


Urine Hyaline Casts 3 /lpf 


 


Urine Mucus FEW /lpf 


 


Microscopic Urinalysis Comment


  CULTURE


INDICATED


 


Blood Urea Nitrogen 13 MG/DL 


 


Creatinine 0.67 MG/DL 


 


Random Glucose 89 MG/DL 


 


Total Protein 7.8 GM/DL 


 


Albumin 4.3 GM/DL 


 


Calcium Level 9.0 MG/DL 


 


Alkaline Phosphatase 65 U/L 


 


Aspartate Amino Transf


(AST/SGOT) 13 U/L 


 


 


Alanine Aminotransferase


(ALT/SGPT) 20 U/L 


 


 


Total Bilirubin 0.3 MG/DL 


 


Sodium Level 141 MEQ/L 


 


Potassium Level 4.4 MEQ/L 


 


Chloride Level 109 MEQ/L 


 


Carbon Dioxide Level 23.6 MEQ/L 


 


Anion Gap 8 MEQ/L 


 


Estimat Glomerular Filtration


Rate 100 ML/MIN 


 


 


Thyroid Stimulating Hormone


3rd Gen 1.150 uIU/ML 


 


 


Salicylates Level 4.0 MG/DL 


 


Acetaminophen Level


  LESS THAN 2.0


MCG/ML


 


Ethyl Alcohol Level


  LESS THAN 3


MG/DL











[facility]


Medical Decision Making


Medical Screen Exam Complete:  Yes


Emergency Medical Condition:  Yes


Medical Record Reviewed:  Yes


Differential Diagnosis


Depression, medication refill, encounter for psychological evaluation


Narrative Course


Patient presents voluntarily.  Physical examination and vital signs are 

essentially unremarkable.  Patient has no medical complaints to report.





Psych screen has been ordered.





If the laboratory results are unremarkable, the patient will be medically 

cleared for psychiatric evaluation and disposition.





Diagnosis





 Primary Impression:  


 Encounter for psychological evaluation


 Additional Impression:  


 Urinary tract infection


 Qualified Codes:  N39.0 - Urinary tract infection, site not specified





***Additional Instructions:  


Take antibiotics as prescribed and complete full course


Drink plenty of fluids


Maintain good personal hygiene


Follow-up with primary care provider


Return to the emergency department immediately with worsening of symptoms


***Med/Other Pt SpecificInfo:  Prescription(s) given


Scripts


Sulfamethoxazole-Trimethoprim (Bactrim DS) 800-160 Mg Tab


1 TAB PO BID for Infection for 5 Days, #10 TAB 0 Refills


   Prov: Audrey De Luna         18


Condition:  Stable











Audrey De Luna May 14, 2018 09:45

## 2018-05-15 VITALS
RESPIRATION RATE: 16 BRPM | OXYGEN SATURATION: 100 % | HEART RATE: 80 BPM | DIASTOLIC BLOOD PRESSURE: 71 MMHG | SYSTOLIC BLOOD PRESSURE: 123 MMHG | TEMPERATURE: 97.8 F

## 2018-05-15 VITALS
TEMPERATURE: 98 F | RESPIRATION RATE: 17 BRPM | HEART RATE: 77 BPM | OXYGEN SATURATION: 100 % | SYSTOLIC BLOOD PRESSURE: 112 MMHG | DIASTOLIC BLOOD PRESSURE: 66 MMHG

## 2018-05-15 LAB
BUN SERPL-MCNC: 11 MG/DL (ref 7–18)
CALCIUM SERPL-MCNC: 9.1 MG/DL (ref 8.5–10.1)
CHLORIDE SERPL-SCNC: 105 MEQ/L (ref 98–107)
CHOLEST SERPL-MCNC: 237 MG/DL (ref 120–200)
CHOLESTEROL/ HDL RATIO: 2.57 RATIO
CREAT SERPL-MCNC: 0.68 MG/DL (ref 0.5–1)
GFR SERPLBLD BASED ON 1.73 SQ M-ARVRAT: 98 ML/MIN (ref 89–?)
GLUCOSE SERPL-MCNC: 84 MG/DL (ref 74–106)
HBA1C MFR BLD: 5.1 % (ref 4.3–6)
HCO3 BLD-SCNC: 23.1 MEQ/L (ref 21–32)
HDLC SERPL-MCNC: 92 MG/DL (ref 40–60)
LDLC SERPL-MCNC: 129 MG/DL (ref 0–99)
SODIUM SERPL-SCNC: 139 MEQ/L (ref 136–145)
TRIGL SERPL-MCNC: 82 MG/DL (ref 42–150)

## 2018-05-15 RX ADMIN — SULFAMETHOXAZOLE AND TRIMETHOPRIM SCH TAB: 800; 160 TABLET ORAL at 21:02

## 2018-05-15 RX ADMIN — ACETAMINOPHEN PRN MG: 325 TABLET ORAL at 17:03

## 2018-05-15 RX ADMIN — SULFAMETHOXAZOLE AND TRIMETHOPRIM SCH TAB: 800; 160 TABLET ORAL at 08:24

## 2018-05-15 RX ADMIN — ACETAMINOPHEN PRN MG: 325 TABLET ORAL at 08:26

## 2018-05-15 RX ADMIN — VENLAFAXINE SCH MG: 25 TABLET ORAL at 21:02

## 2018-05-15 RX ADMIN — NICOTINE SCH PATCH: 21 PATCH, EXTENDED RELEASE TOPICAL at 18:10

## 2018-05-15 NOTE — PD.CONS
HPI


Service


[facility]


Consult Requested By


Dr. Contreras


Reason for Consult


Management of hypertension


Primary Care Physician


No Primary Care Physician


Diagnoses:  


History of Present Illness


37-year-old female with a history of hypertension is admitted to the 

psychiatric unit for bipolar, depression, anxiety.  [facility] was consulted for 

medical management of hypertension.  Patient states she is compliant with 

outpatient medications lisinopril and hydrochlorothiazide.  Denies any chest 

pain.  She states she does get palpitations with anxiety but when her anxiety 

is controlled she does not get palpitations.  She does complain of burning with 

urination and frequency.  Denies any hematuria, fever or chills.





Review of Systems


Except as stated in HPI:  all other systems reviewed are Neg





Past Family Social History


Allergies:  


Coded Allergies:  


     amoxicillin (Verified  Allergy, Severe, HIVES, VOMITING, 5/14/18)


     penicillin G (Verified  Allergy, Severe, HIVES, VOMITING, 5/14/18)


Past Medical History


Hypertension


Anxiety


Bipolar


Past Surgical History


Splenectomy


Left ankle repair


Reported Medications








Reported Meds & Active Scripts


Active


Bactrim DS (Sulfamethoxazole-Trimethoprim) 800-160 Mg Tab 1 Tab PO BID 5 Days


Reported


Lisinopril 10 Mg Tab 10 Mg PO DAILY


Hydrochlorothiazide 12.5 Mg Tab 12.5 Mg PO DAILY


Active Ordered Medications





Current Medications








 Medications


  (Trade)  Dose


 Ordered  Sig/Keli


 Route  Start Time


 Stop Time Status Last Admin


 


  (Bactrim Ds


 800-160 Mg)  1 tab  Q12HR


 PO  5/14/18 11:30


 5/19/18 11:29  5/15/18 08:24


 


 


  (Tylenol)  650 mg  Q4H  PRN


 PO  5/14/18 19:00


    5/15/18 08:26


 


 


  (Milk Of


 Magnesia Liq)  30 ml  DAILY  PRN


 PO  5/14/18 19:00


     


 


 


  (Mag-Al Plus


 Susp Liq)  30 ml  Q6H  PRN


 PO  5/14/18 19:00


     


 


 


  (Ativan)  1 mg  Q4H  PRN


 PO  5/14/18 19:45


    5/15/18 08:34


 


 


  (Ativan Inj)  1 mg  Q4H  PRN


 IM  5/14/18 19:45


     


 


 


  (Ativan)  2 mg  Q2H  PRN


 PO  5/14/18 19:45


     


 


 


  (Ativan Inj)  2 mg  Q2H  PRN


 IM  5/14/18 19:45


     


 


 


  (Ativan Inj)  2 mg  Q1H  PRN


 IM  5/14/18 19:45


     


 


 


  (Ativan Inj)  2 mg  Q15M  PRN


 IM  5/14/18 19:45


     


 


 


  (Romazicon Inj)  0.2 mg  Q1M  PRN


 IV PUSH  5/14/18 19:45


     


 


 


  (Effexor)  75 mg  BID


 PO  5/15/18 21:00


     


 


 


  (SEROquel)  25 mg  BID@0800,1600


 PO  5/15/18 16:00


     


 


 


  (SEROquel)  50 mg  HS


 PO  5/15/18 21:00


     


 


 


  (Benadryl)  50 mg  HS  PRN


 PO  5/15/18 14:15


     


 


 


  (Atarax)  50 mg  Q6H  PRN


 PO  5/15/18 14:15


     


 


 


  (Microzide)  12.5 mg  DAILY


 PO  5/16/18 09:00


     


 


 


  (Prinivil)  10 mg  DAILY


 PO  5/16/18 09:00


     


 








Family History


Patient denies any family history, no heart disease or cancer.


Social History


Tobacco use: 1 PPD


Alcohol use: Denies





Physical Exam


Vital Signs





Vital Signs








  Date Time  Temp Pulse Resp B/P (MAP) Pulse Ox O2 Delivery O2 Flow Rate FiO2


 


5/15/18 06:15 98.0 77 17 112/66 (81) 100   


 


5/14/18 21:15 98.0 78 15 132/93 (106) 99   


 


5/14/18 21:09        


 


5/14/18 19:50  70 16 124/77 (93) 98 Room Air  








Physical Exam


GENERAL: This is a well-nourished, well-developed patient, in no apparent 

distress.


SKIN: No rashes, ecchymoses or lesions. Cool and dry.


EYES: Pupils equal round and reactive. Extraocular motions intact. No scleral 

icterus. No injection or drainage. 


CARDIOVASCULAR: Regular rate and rhythm without murmurs, gallops, or rubs. 


RESPIRATORY: Clear to auscultation. Breath sounds equal bilaterally. No wheezes

, rales, or rhonchi.  


GASTROINTESTINAL: Abdomen soft, non-tender, nondistended.


MUSCULOSKELETAL: Extremities without clubbing, cyanosis, or edema. No calf 

tenderness. 


NEUROLOGICAL: Awake and alert.   Normal speech.


Laboratory





Laboratory Tests








Test


  5/15/18


07:40


 


Blood Urea Nitrogen 11 


 


Creatinine 0.68 


 


Random Glucose 84 


 


Calcium Level 9.1 


 


Sodium Level 139 


 


Potassium Level 4.2 


 


Chloride Level 105 


 


Carbon Dioxide Level 23.1 


 


Anion Gap 11 


 


Estimat Glomerular Filtration


Rate 98 


 


 


Triglycerides Level 82 


 


Cholesterol Level 237 


 


LDL Cholesterol 129 


 


HDL Cholesterol 92.0 


 


Cholesterol/HDL Ratio 2.57 














 Date/Time


Source Procedure


Growth Status


 


 


 5/14/18 10:00


Urine Clean Catch Urine Culture


Pending Received








Result Diagram:  


5/14/18 1000                                                                   

             5/15/18 0740








Assessment and Plan


Problem List:  


(1) HTN (hypertension)


ICD Code:  I10 - Essential (primary) hypertension


(2) Urinary tract infection


ICD Code:  N39.0 - Urinary tract infection, site not specified


Status:  Acute


(3) Bipolar disorder with current episode depressed


ICD Code:  F31.30 - Bipolar disorder, current episode depressed, mild or 

moderate severity, unspecified


Status:  Acute


Assessment and Plan


37-year-old female with a history of hypertension is admitted to the 

psychiatric unit for bipolar, depression, anxiety. 








Bipolar with depression


   -Managed by psych





HTN, chronic


   -Resume home medications Lisinopril and HCTZ


   -Monitor vitals





UTI, acute


Abnormal UA with large leukocyte esterase and WBCs


   -Cont Bactrim for 5 days


   -Urine culture pending





Tobacco abuse


   -Encouraged to quit





DVT prophylaxis: Encourage ambulation


Discussed Condition With


Patient and RN





Problem Qualifiers





(1) Urinary tract infection:  


Qualified Codes:  N39.0 - Urinary tract infection, site not specified


(2) Bipolar disorder with current episode depressed:  


Qualified Codes:  F31.32 - Bipolar disorder, current episode depressed, moderate








Haven Veras May 15, 2018 16:43
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
## 2018-05-16 VITALS
TEMPERATURE: 98.1 F | DIASTOLIC BLOOD PRESSURE: 78 MMHG | HEART RATE: 119 BPM | SYSTOLIC BLOOD PRESSURE: 119 MMHG | RESPIRATION RATE: 17 BRPM | OXYGEN SATURATION: 99 %

## 2018-05-16 VITALS
DIASTOLIC BLOOD PRESSURE: 74 MMHG | HEART RATE: 76 BPM | SYSTOLIC BLOOD PRESSURE: 128 MMHG | RESPIRATION RATE: 18 BRPM | TEMPERATURE: 98.2 F | OXYGEN SATURATION: 98 %

## 2018-05-16 RX ADMIN — SULFAMETHOXAZOLE AND TRIMETHOPRIM SCH TAB: 800; 160 TABLET ORAL at 08:50

## 2018-05-16 RX ADMIN — SULFAMETHOXAZOLE AND TRIMETHOPRIM SCH TAB: 800; 160 TABLET ORAL at 20:52

## 2018-05-16 RX ADMIN — VENLAFAXINE SCH MG: 25 TABLET ORAL at 20:52

## 2018-05-16 RX ADMIN — HYDROXYZINE HYDROCHLORIDE PRN MG: 50 TABLET, FILM COATED ORAL at 16:46

## 2018-05-16 RX ADMIN — HYDROCHLOROTHIAZIDE SCH MG: 12.5 CAPSULE ORAL at 08:50

## 2018-05-16 RX ADMIN — VENLAFAXINE SCH MG: 25 TABLET ORAL at 08:50

## 2018-05-16 RX ADMIN — NICOTINE SCH PATCH: 21 PATCH, EXTENDED RELEASE TOPICAL at 08:50

## 2018-05-16 NOTE — HHI.PYPN
Subjective


Remarks


Patient seen in her room with nurse Verenice, chart reviewed, patient complaint 

medications, patient discussed with nurse.  Patient continues isolate with a 

sad face somewhat distracted, states she did not sleep very well.  Says she 

still feels anxious.  She is vague about suicidality today.  We will increase 

daytime Seroquel to 8 AM 1 PM and 6 PM, and increase Lexapro to 20 mg





Review of Systems


Except as stated in HPI:  all other systems reviewed are Neg





Mental Status Examination


Appearance:  Appropriate (Dressed casually with fair hygiene.)


Consciousness:  Alert


Orientation:  x4


Motor Activity:  Normal gait


Speech:  Unremarkable


Language:  Adequate


Fund of Knowledge:  Adequate


Attention and Concentration:  Adequate


Memory:  Unremarkable


Mood:  Sad, Other (Depressed)


Affect:  Other (Decreased range and intensity, tearful)


Thought Process & Associations:  Intact, Logical, Goal directed


Thought Content:  Appropriate


Hallucination Type:  None


Delusion Type:  None


Suicidal Ideation:  No


Suicidal Plan:  No


Suicidal Intention:  No


Homicidal Ideation:  No


Homicidal Plan:  No


Homicidal Intention:  No


Insight:  Fair


Judgment:  Adequate





Results


Labs











 Date/Time


Source Procedure


Growth Status


 


 


 5/14/18 10:00


Urine Clean Catch Urine Culture - Final


NO GROWTH IN 48 HOURS. Complete








Vitals/IOs





Vital Signs








  Date Time  Temp Pulse Resp B/P (MAP) Pulse Ox O2 Delivery O2 Flow Rate FiO2


 


5/16/18 06:38 98.2 76 18 128/74 (92) 98   


 


5/14/18 19:50      Room Air  











Assessment & Plan


Problem List:  


(1) History of opiate abuse


(2) Bipolar disorder with current episode depressed


ICD Codes:  F31.30 - Bipolar disorder, current episode depressed, mild or 

moderate severity, unspecified


Status:  Acute


Assessment & Plan


Estimated LOS:  days patient continues depressed she medication adjustments 

above


Justification for Cont. Inpt.


At this time patient would decompensate a place to the lower level of care


Discharge Planning


Return to family


Request HC Surrog/Guard Advoc?:  No





Problem Qualifiers





(1) Bipolar disorder with current episode depressed:  


Qualified Codes:  F31.32 - Bipolar disorder, current episode depressed, moderate








Addy Contreras MD May 16, 2018 14:56

## 2018-05-16 NOTE — PD.TTN
Patient Problems


1. Discharge planning


2. Medication compliance


3. Knowledge deficit


4. Lack of coping skills





Progress Toward Goals


Provider Present:  Dr. LEEANNE Thomas


Provider Input:  


5/16/18-meets criteria/needs to remain for further stabilization.


Nurse(s) Input:  


5/16/18-[facility]- medcompliant/cooperative/appropriate with staff.


Psychiatric Counselors Present:  Annamaria Coyle LCSW


Psych Therapist Input:  


5/16/18- Pt. motivated for treatmwent/cooeprative


Group Spec/RT/OT/BAUM Present:  BAUTISTA Rucker


Group Spec/RT/OT/BAUM Input:  


5/16/18- Pt. attends somegroups. Pt. often isolates to slef.











Kaleb Luna May 16, 2018 15:41

## 2018-05-17 VITALS
HEART RATE: 87 BPM | RESPIRATION RATE: 16 BRPM | TEMPERATURE: 98.2 F | OXYGEN SATURATION: 98 % | SYSTOLIC BLOOD PRESSURE: 121 MMHG | DIASTOLIC BLOOD PRESSURE: 60 MMHG

## 2018-05-17 VITALS
SYSTOLIC BLOOD PRESSURE: 120 MMHG | RESPIRATION RATE: 16 BRPM | TEMPERATURE: 97.9 F | HEART RATE: 81 BPM | OXYGEN SATURATION: 98 % | DIASTOLIC BLOOD PRESSURE: 62 MMHG

## 2018-05-17 RX ADMIN — VENLAFAXINE SCH MG: 25 TABLET ORAL at 22:08

## 2018-05-17 RX ADMIN — SULFAMETHOXAZOLE AND TRIMETHOPRIM SCH TAB: 800; 160 TABLET ORAL at 08:26

## 2018-05-17 RX ADMIN — VENLAFAXINE SCH MG: 25 TABLET ORAL at 08:27

## 2018-05-17 RX ADMIN — HYDROCHLOROTHIAZIDE SCH MG: 12.5 CAPSULE ORAL at 08:26

## 2018-05-17 RX ADMIN — SULFAMETHOXAZOLE AND TRIMETHOPRIM SCH TAB: 800; 160 TABLET ORAL at 22:08

## 2018-05-17 RX ADMIN — HYDROXYZINE HYDROCHLORIDE PRN MG: 50 TABLET, FILM COATED ORAL at 05:33

## 2018-05-17 RX ADMIN — NICOTINE SCH PATCH: 21 PATCH, EXTENDED RELEASE TOPICAL at 08:30

## 2018-05-17 RX ADMIN — LISINOPRIL SCH MG: 20 TABLET ORAL at 08:27

## 2018-05-17 NOTE — HHI.PR
Subjective


Remarks


Patient c/o anxiety


Patient offers no other complaints at this time





Objective


Vitals





Vital Signs








  Date Time  Temp Pulse Resp B/P (MAP) Pulse Ox O2 Delivery O2 Flow Rate FiO2


 


5/17/18 05:46 98.2 87 16 121/60 (80) 98   


 


5/16/18 17:00 98.1 119 17 119/78 (92) 99   








Result Diagram:  


5/14/18 1000                                                                   

             5/15/18 0740





Other Results





 Laboratory Tests








Test


  5/14/18


10:00 5/15/18


07:40


 


White Blood Count 8.2 TH/MM3  


 


Red Blood Count 4.16 MIL/MM3  


 


Hemoglobin 14.0 GM/DL  


 


Hematocrit 39.0 %  


 


Mean Corpuscular Volume 93.7 FL  


 


Mean Corpuscular Hemoglobin 33.7 PG  


 


Mean Corpuscular Hemoglobin


Concent 36.0 % 


  


 


 


Red Cell Distribution Width 18.5 %  


 


Platelet Count 573 TH/MM3  


 


Mean Platelet Volume 7.4 FL  


 


Neutrophils (%) (Auto) 68.0 %  


 


Lymphocytes (%) (Auto) 24.5 %  


 


Monocytes (%) (Auto) 5.6 %  


 


Eosinophils (%) (Auto) 1.0 %  


 


Basophils (%) (Auto) 0.9 %  


 


Neutrophils # (Auto) 5.6 TH/MM3  


 


Lymphocytes # (Auto) 2.0 TH/MM3  


 


Monocytes # (Auto) 0.5 TH/MM3  


 


Eosinophils # (Auto) 0.1 TH/MM3  


 


Basophils # (Auto) 0.1 TH/MM3  


 


CBC Comment AUTO DIFF  


 


Differential Comment


  AUTO DIFF


CONFIRMED 


 


 


Platelet Estimate HIGH  


 


Platelet Morphology Comment NORMAL  


 


Acanthocytes OCC  


 


Urine Color YELLOW  


 


Urine Turbidity HAZY  


 


Urine pH 7.0  


 


Urine Specific Gravity 1.013  


 


Urine Protein NEG mg/dL  


 


Urine Glucose (UA) NEG mg/dL  


 


Urine Ketones NEG mg/dL  


 


Urine Occult Blood MOD  


 


Urine Nitrite NEG  


 


Urine Bilirubin NEG  


 


Urine Urobilinogen


  LESS THAN 2.0


MG/DL 


 


 


Urine Leukocyte Esterase LARGE  


 


Urine RBC 4 /hpf  


 


Urine WBC 49 /hpf  


 


Urine Squamous Epithelial


Cells 27 /hpf 


  


 


 


Urine Bacteria FEW /hpf  


 


Urine Hyaline Casts 3 /lpf  


 


Urine Mucus FEW /lpf  


 


Microscopic Urinalysis Comment


  CULTURE


INDICATED 


 


 


Blood Urea Nitrogen 13 MG/DL  11 MG/DL 


 


Creatinine 0.67 MG/DL  0.68 MG/DL 


 


Random Glucose 89 MG/DL  84 MG/DL 


 


Total Protein 7.8 GM/DL  


 


Albumin 4.3 GM/DL  


 


Calcium Level 9.0 MG/DL  9.1 MG/DL 


 


Alkaline Phosphatase 65 U/L  


 


Aspartate Amino Transf


(AST/SGOT) 13 U/L 


  


 


 


Alanine Aminotransferase


(ALT/SGPT) 20 U/L 


  


 


 


Total Bilirubin 0.3 MG/DL  


 


Sodium Level 141 MEQ/L  139 MEQ/L 


 


Potassium Level 4.4 MEQ/L  4.2 MEQ/L 


 


Chloride Level 109 MEQ/L  105 MEQ/L 


 


Carbon Dioxide Level 23.6 MEQ/L  23.1 MEQ/L 


 


Anion Gap 8 MEQ/L  11 MEQ/L 


 


Estimat Glomerular Filtration


Rate 100 ML/MIN 


  98 ML/MIN 


 


 


Thyroid Stimulating Hormone


3rd Gen 1.150 uIU/ML 


  


 


 


Salicylates Level 4.0 MG/DL  


 


Acetaminophen Level


  LESS THAN 2.0


MCG/ML 


 


 


Ethyl Alcohol Level


  LESS THAN 3


MG/DL 


 


 


Hemoglobin A1c  5.1 % 


 


Triglycerides Level  82 MG/DL 


 


Cholesterol Level  237 MG/DL 


 


LDL Cholesterol  129 MG/DL 


 


HDL Cholesterol  92.0 MG/DL 


 


Cholesterol/HDL Ratio  2.57 RATIO 








Objective Remarks


GENERAL: This is a well-nourished, well-developed patient, in no apparent 

distress.


CARDIOVASCULAR: Regular rate and rhythm


RESPIRATORY: Clear to auscultation. Breath sounds equal bilaterally. 


GASTROINTESTINAL: Abdomen soft, non-tender, nondistended. Normal active bowel 

sounds


MUSCULOSKELETAL: Extremities without clubbing, cyanosis, or edema.


NEURO:  Alert & Oriented.  Moves all ext x4





A/P


Problem List:  


(1) HTN (hypertension)


ICD Codes:  I10 - Essential (primary) hypertension


(2) Urinary tract infection


ICD Codes:  N39.0 - Urinary tract infection, site not specified


Status:  Acute


(3) Bipolar disorder with current episode depressed


ICD Codes:  F31.30 - Bipolar disorder, current episode depressed, mild or 

moderate severity, unspecified


Status:  Acute


Assessment and Plan


37-year-old female with a history of hypertension is admitted to the 

psychiatric unit for bipolar, depression, anxiety. 





Bipolar with depression


   -Managed by psych





HTN, chronic


   -Resume home HCTZ


   -Lisinopril increased to 20 mg daily by psych, BP improved


   -Monitor vitals





UTI, acute


Abnormal UA with large leukocyte esterase and WBCs


   -Cont Bactrim for 5 days


   -Urine culture revealed no growth, can DC Bactrim





Tobacco abuse


   -Encouraged to quit





DVT prophylaxis: Encourage ambulation





Patient appears medically stable at this time will sign off.  If patient's 

condition changes or further assistance is needed please reconsult.





Supervising physician Dr. Martínez





Problem Qualifiers





(1) Urinary tract infection:  


Qualified Codes:  N39.0 - Urinary tract infection, site not specified


(2) Bipolar disorder with current episode depressed:  


Qualified Codes:  F31.32 - Bipolar disorder, current episode depressed, moderate








Aracelis Finn May 17, 2018 09:59

## 2018-05-17 NOTE — HHI.PYPN
Subjective


Remarks


Patient is seen today in the [facility] with nurse p.m., chart reviewed, patient 

complaint medications, patient discussed with nurse.  Patient states she is 

feeling better, denies suicidality or voices.  However she still has marked 

decreased range and intensity of her affect.  However she states she feels much 

better than when she was first admitted.  She says she has had good 

conversations with her family.  Patient continues to improve we will consider 

discharge tomorrow





Review of Systems


Except as stated in HPI:  all other systems reviewed are Neg





Mental Status Examination


Appearance:  Appropriate (Dressed casually with fair hygiene.)


Consciousness:  Alert


Orientation:  x4


Motor Activity:  Normal gait


Speech:  Unremarkable


Language:  Adequate


Fund of Knowledge:  Adequate


Attention and Concentration:  Adequate


Memory:  Unremarkable


Mood:  Sad, Other (Depressed)


Affect:  Other (Decreased range and intensity, tearful)


Thought Process & Associations:  Intact, Logical, Goal directed


Thought Content:  Appropriate


Hallucination Type:  None


Delusion Type:  None


Suicidal Ideation:  No


Suicidal Plan:  No


Suicidal Intention:  No


Homicidal Ideation:  No


Homicidal Plan:  No


Homicidal Intention:  No


Insight:  Fair


Judgment:  Adequate





Results


Labs











 Date/Time


Source Procedure


Growth Status


 


 


 5/14/18 10:00


Urine Clean Catch Urine Culture - Final


NO GROWTH IN 48 HOURS. Complete








Vitals/IOs





Vital Signs








  Date Time  Temp Pulse Resp B/P (MAP) Pulse Ox O2 Delivery O2 Flow Rate FiO2


 


5/17/18 05:46 98.2 87 16 121/60 (80) 98   


 


5/14/18 19:50      Room Air  











Assessment & Plan


Problem List:  


(1) History of opiate abuse


(2) Bipolar disorder with current episode depressed


ICD Codes:  F31.30 - Bipolar disorder, current episode depressed, mild or 

moderate severity, unspecified


Status:  Acute


Assessment & Plan


Estimated LOS:  days patient continues somewhat depressed with decreased range 

and intensity of her affect but she is improving with somewhat improved eye 

contact.  Compliant medications denies suicidality or homicidality voices or 

visions for now continue treatment


Justification for Cont. Inpt.


At this time patient would decompensate a place to a lower level of care


Discharge Planning


Return to family


Request HC Surrog/Guard Advoc?:  No





Problem Qualifiers





(1) Bipolar disorder with current episode depressed:  


Qualified Codes:  F31.32 - Bipolar disorder, current episode depressed, moderate








Addy Contreras MD May 17, 2018 13:15

## 2018-05-18 VITALS
TEMPERATURE: 98 F | HEART RATE: 85 BPM | OXYGEN SATURATION: 99 % | SYSTOLIC BLOOD PRESSURE: 117 MMHG | RESPIRATION RATE: 14 BRPM | DIASTOLIC BLOOD PRESSURE: 72 MMHG

## 2018-05-18 RX ADMIN — SULFAMETHOXAZOLE AND TRIMETHOPRIM SCH TAB: 800; 160 TABLET ORAL at 09:31

## 2018-05-18 RX ADMIN — NICOTINE SCH PATCH: 21 PATCH, EXTENDED RELEASE TOPICAL at 09:00

## 2018-05-18 RX ADMIN — LISINOPRIL SCH MG: 20 TABLET ORAL at 09:31

## 2018-05-18 RX ADMIN — HYDROXYZINE HYDROCHLORIDE PRN MG: 50 TABLET, FILM COATED ORAL at 05:52

## 2018-05-18 RX ADMIN — HYDROCHLOROTHIAZIDE SCH MG: 12.5 CAPSULE ORAL at 09:31

## 2018-05-18 RX ADMIN — VENLAFAXINE SCH MG: 25 TABLET ORAL at 09:32
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
